# Patient Record
Sex: FEMALE | Race: WHITE | NOT HISPANIC OR LATINO | URBAN - NONMETROPOLITAN AREA
[De-identification: names, ages, dates, MRNs, and addresses within clinical notes are randomized per-mention and may not be internally consistent; named-entity substitution may affect disease eponyms.]

---

## 2017-10-16 ENCOUNTER — TELEPHONE (OUTPATIENT)
Dept: VASCULAR SURGERY | Facility: CLINIC | Age: 22
End: 2017-10-16

## 2017-10-17 ENCOUNTER — OFFICE VISIT (OUTPATIENT)
Dept: VASCULAR SURGERY | Facility: CLINIC | Age: 22
End: 2017-10-17

## 2017-10-17 VITALS
WEIGHT: 180 LBS | DIASTOLIC BLOOD PRESSURE: 66 MMHG | HEIGHT: 64 IN | SYSTOLIC BLOOD PRESSURE: 102 MMHG | BODY MASS INDEX: 30.73 KG/M2 | HEART RATE: 100 BPM

## 2017-10-17 DIAGNOSIS — M79.89 LEG SWELLING: Primary | ICD-10-CM

## 2017-10-17 DIAGNOSIS — Z87.828 HISTORY OF GUNSHOT WOUND: ICD-10-CM

## 2017-10-17 PROCEDURE — 99213 OFFICE O/P EST LOW 20 MIN: CPT | Performed by: SURGERY
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
## 2020-09-19 ENCOUNTER — HOSPITAL ENCOUNTER (EMERGENCY)
Facility: HOSPITAL | Age: 25
Discharge: HOME | End: 2020-09-19
Admitting: EMERGENCY MEDICINE

## 2020-09-19 ENCOUNTER — APPOINTMENT (OUTPATIENT)
Dept: GENERAL RADIOLOGY | Facility: HOSPITAL | Age: 25
End: 2020-09-19

## 2020-09-19 VITALS
DIASTOLIC BLOOD PRESSURE: 95 MMHG | HEART RATE: 71 BPM | SYSTOLIC BLOOD PRESSURE: 139 MMHG | TEMPERATURE: 98 F | BODY MASS INDEX: 45.71 KG/M2 | RESPIRATION RATE: 16 BRPM | WEIGHT: 258 LBS | OXYGEN SATURATION: 99 % | HEIGHT: 63 IN

## 2020-09-19 DIAGNOSIS — S80.12XA CONTUSION OF MULTIPLE SITES OF LEFT LOWER EXTREMITY, INITIAL ENCOUNTER: Primary | ICD-10-CM

## 2020-09-19 PROCEDURE — 73560 X-RAY EXAM OF KNEE 1 OR 2: CPT

## 2020-09-19 PROCEDURE — 73590 X-RAY EXAM OF LOWER LEG: CPT

## 2020-09-19 PROCEDURE — 99283 EMERGENCY DEPT VISIT LOW MDM: CPT

## 2020-09-19 RX ORDER — NAPROXEN 500 MG/1
500 TABLET ORAL 2 TIMES DAILY PRN
Qty: 10 TABLET | Refills: 0 | Status: SHIPPED | OUTPATIENT
Start: 2020-09-19 | End: 2020-09-24

## 2020-09-19 RX ORDER — TRAZODONE HYDROCHLORIDE 50 MG/1
50 TABLET ORAL NIGHTLY
COMMUNITY

## 2024-02-22 LAB
AMPHETAMINES UR QL: NEGATIVE
BACTERIA SPEC AEROBE CULT: NO GROWTH
BUPRENORPHINE SERPL-MCNC: NEGATIVE NG/ML
C TRACH RRNA SPEC DONR QL NAA+PROBE: NOT DETECTED
CANNABINOIDS SERPL QL: POSITIVE
COCAINE SERPL CFM-MCNC: NEGATIVE NG/ML
EXTERNAL ABO GROUPING: NORMAL
EXTERNAL AMPHETAMINE SCREEN URINE: NEGATIVE
EXTERNAL ANTIBODY SCREEN: NORMAL
EXTERNAL ANTIBODY SCREEN: NORMAL
EXTERNAL BARBITURATE SCREEN URINE: NEGATIVE
EXTERNAL BENZODIAZEPINE SCREEN URINE: NEGATIVE
EXTERNAL HEMATOCRIT: 38 %
EXTERNAL HEMOGLOBIN: 12.2 G/DL
EXTERNAL HEPATITIS B SURFACE ANTIGEN: NEGATIVE
EXTERNAL METHADONE SCREEN URINE: NEGATIVE
EXTERNAL PHENCYCLIDINE SCREEN URINE: NEGATIVE
EXTERNAL PLATELET COUNT: 354 K/ΜL
EXTERNAL PROPOXYPHENE SCREEN URINE: NEGATIVE
EXTERNAL RH FACTOR: POSITIVE
EXTERNAL SYPHILIS RPR SCREEN: NORMAL
HCV AB S/CO SERPL IA: NORMAL
HIV 1+2 AB+HIV1 P24 AG SERPL QL IA: NEGATIVE
N GONORRHOEA DNA SPEC QL NAA+PROBE: NOT DETECTED
OPIATES UR QL: NEGATIVE
OXYCODONE UR QL SCN: NEGATIVE
RUBV IGG SERPL IA-ACNC: NORMAL
TRICYCLICS UR QL SCN: NEGATIVE

## 2024-03-07 LAB
GLUCOSE 1H P 100 G GLC PO SERPL-MCNC: 141 MG/DL
GLUCOSE 1H P 100 G GLC PO SERPL-MCNC: 141 MG/DL (ref 74–180)

## 2024-03-25 ENCOUNTER — INITIAL PRENATAL (OUTPATIENT)
Age: 29
End: 2024-03-25
Payer: COMMERCIAL

## 2024-03-25 VITALS
BODY MASS INDEX: 40.46 KG/M2 | WEIGHT: 237 LBS | HEIGHT: 64 IN | SYSTOLIC BLOOD PRESSURE: 128 MMHG | DIASTOLIC BLOOD PRESSURE: 90 MMHG

## 2024-03-25 DIAGNOSIS — Z3A.29 29 WEEKS GESTATION OF PREGNANCY: Primary | ICD-10-CM

## 2024-03-25 DIAGNOSIS — Z71.85 IMMUNIZATION COUNSELING: ICD-10-CM

## 2024-03-25 DIAGNOSIS — O99.810 ABNORMAL GLUCOSE TOLERANCE TEST IN PREGNANCY, ANTEPARTUM: ICD-10-CM

## 2024-03-25 DIAGNOSIS — Z23 NEED FOR TDAP VACCINATION: ICD-10-CM

## 2024-03-25 DIAGNOSIS — O09.30 LATE PRENATAL CARE: ICD-10-CM

## 2024-03-25 DIAGNOSIS — O99.330 TOBACCO USE DURING PREGNANCY, ANTEPARTUM: ICD-10-CM

## 2024-03-25 DIAGNOSIS — Z34.03 PRIMIGRAVIDA, THIRD TRIMESTER: ICD-10-CM

## 2024-03-25 DIAGNOSIS — O99.320 MARIJUANA USE DURING PREGNANCY: ICD-10-CM

## 2024-03-25 DIAGNOSIS — F12.90 MARIJUANA USE DURING PREGNANCY: ICD-10-CM

## 2024-03-25 DIAGNOSIS — O13.3 GESTATIONAL HYPERTENSION, THIRD TRIMESTER: ICD-10-CM

## 2024-03-25 PROBLEM — Z34.90 PREGNANCY: Status: ACTIVE | Noted: 2024-03-25

## 2024-03-25 PROBLEM — O13.9 GESTATIONAL HYPERTENSION, ANTEPARTUM: Status: ACTIVE | Noted: 2024-03-25

## 2024-03-25 PROCEDURE — 90471 IMMUNIZATION ADMIN: CPT | Performed by: ADVANCED PRACTICE MIDWIFE

## 2024-03-25 PROCEDURE — 90715 TDAP VACCINE 7 YRS/> IM: CPT | Performed by: ADVANCED PRACTICE MIDWIFE

## 2024-03-25 PROCEDURE — 99203 OFFICE O/P NEW LOW 30 MIN: CPT | Performed by: ADVANCED PRACTICE MIDWIFE

## 2024-03-25 RX ORDER — PNV NO.118/IRON FUMARATE/FA 29 MG-1 MG
TABLET,CHEWABLE ORAL
COMMUNITY
Start: 2024-03-22

## 2024-03-25 RX ORDER — PROMETHAZINE HYDROCHLORIDE 25 MG/1
TABLET ORAL
COMMUNITY
Start: 2024-02-22
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
## 2024-03-26 LAB
ALBUMIN SERPL-MCNC: 3.7 G/DL (ref 4–5)
ALBUMIN/GLOB SERPL: 1.4 {RATIO} (ref 1.2–2.2)
ALP SERPL-CCNC: 105 IU/L (ref 44–121)
ALT SERPL-CCNC: 51 IU/L (ref 0–32)
AST SERPL-CCNC: 32 IU/L (ref 0–40)
BASOPHILS # BLD AUTO: 0.1 X10E3/UL (ref 0–0.2)
BASOPHILS NFR BLD AUTO: 1 %
BILIRUB SERPL-MCNC: <0.2 MG/DL (ref 0–1.2)
BUN SERPL-MCNC: 6 MG/DL (ref 6–20)
BUN/CREAT SERPL: 13 (ref 9–23)
CALCIUM SERPL-MCNC: 9.4 MG/DL (ref 8.7–10.2)
CHLORIDE SERPL-SCNC: 104 MMOL/L (ref 96–106)
CO2 SERPL-SCNC: 19 MMOL/L (ref 20–29)
CREAT SERPL-MCNC: 0.48 MG/DL (ref 0.57–1)
EGFRCR SERPLBLD CKD-EPI 2021: 131 ML/MIN/1.73
EOSINOPHIL # BLD AUTO: 0.1 X10E3/UL (ref 0–0.4)
EOSINOPHIL NFR BLD AUTO: 0 %
ERYTHROCYTE [DISTWIDTH] IN BLOOD BY AUTOMATED COUNT: 13.1 % (ref 11.7–15.4)
GLOBULIN SER CALC-MCNC: 2.6 G/DL (ref 1.5–4.5)
GLUCOSE SERPL-MCNC: 102 MG/DL (ref 70–99)
HCT VFR BLD AUTO: 40.4 % (ref 34–46.6)
HGB BLD-MCNC: 13.6 G/DL (ref 11.1–15.9)
IMM GRANULOCYTES # BLD AUTO: 0.4 X10E3/UL (ref 0–0.1)
IMM GRANULOCYTES NFR BLD AUTO: 3 %
LDH SERPL L TO P-CCNC: 174 IU/L (ref 119–226)
LYMPHOCYTES # BLD AUTO: 1.7 X10E3/UL (ref 0.7–3.1)
LYMPHOCYTES NFR BLD AUTO: 13 %
MCH RBC QN AUTO: 30.1 PG (ref 26.6–33)
MCHC RBC AUTO-ENTMCNC: 33.7 G/DL (ref 31.5–35.7)
MCV RBC AUTO: 89 FL (ref 79–97)
MONOCYTES # BLD AUTO: 1 X10E3/UL (ref 0.1–0.9)
MONOCYTES NFR BLD AUTO: 8 %
NEUTROPHILS # BLD AUTO: 9.8 X10E3/UL (ref 1.4–7)
NEUTROPHILS NFR BLD AUTO: 75 %
PLATELET # BLD AUTO: 332 X10E3/UL (ref 150–450)
POTASSIUM SERPL-SCNC: 4.3 MMOL/L (ref 3.5–5.2)
PROT 24H UR-MRATE: 180 MG/24 HR (ref 30–150)
PROT SERPL-MCNC: 6.3 G/DL (ref 6–8.5)
PROT UR-MCNC: 18.9 MG/DL
RBC # BLD AUTO: 4.52 X10E6/UL (ref 3.77–5.28)
SODIUM SERPL-SCNC: 138 MMOL/L (ref 134–144)
URATE SERPL-MCNC: 4.6 MG/DL (ref 2.6–6.2)
WBC # BLD AUTO: 13 X10E3/UL (ref 3.4–10.8)

## 2024-03-27 ENCOUNTER — REFERRAL TRIAGE (OUTPATIENT)
Dept: LABOR AND DELIVERY | Facility: HOSPITAL | Age: 29
End: 2024-03-27
Payer: COMMERCIAL

## 2024-03-27 ENCOUNTER — PATIENT OUTREACH (OUTPATIENT)
Dept: LABOR AND DELIVERY | Facility: HOSPITAL | Age: 29
End: 2024-03-27
Payer: COMMERCIAL

## 2024-03-27 NOTE — OUTREACH NOTE
[facility]  Enrollment    Current Estimated Gestational Age: 30w0d    Questions/Answers      Flowsheet Row Responses   Would like to participate? Yes   Date of Intake Visit 03/27/24            [facility]  Intake    Current Estimated Gestational Age: 30w0d    Intake Assessment      Flowsheet Row Responses   Best Method for Contacting Cell   Currently Employed No   Able to keep appointments as scheduled Yes   Gender(s) and Name(s) boy   Do you have a dentist? Yes   Resources Presently Utilizing: WIC (Women, Infant, Children)   Maternal Warning Signs Provided   Other: Provided   Other Education HANDS, How to find a dentist, How to find a pediatrician, How to find a primary care provider, Insurance benefits/Incentives, Meds to Beds, Mental Health Services, Smoking/Vaping Cessation, SNAP Benefits, Substance Use Disorder Treatment, Transportation Assistance, WIC Benefits            Learning Assessment      Flowsheet Row Responses   Relationship Patient   Does the learner have any barriers to learning? No Barriers   What is the preferred language of the learner for medical teaching? English   How does the learner prefer to learn new concepts? Listening, Reading, Demonstration, Pictures/Video            SDOH updated and reviewed with the patient during this program:  --     Alcohol Use: Not At Risk (3/27/2024)    AUDIT-C     Frequency of Alcohol Consumption: Never     Average Number of Drinks: Patient does not drink     Frequency of Binge Drinking: Never      --     Depression: Not at risk (3/27/2024)    PHQ-2     PHQ-2 Score: 0      --     Disabilities: Not At Risk (3/27/2024)    Disabilities     Concentrating, Remembering, or Making Decisions Difficulty: no     Doing Errands Independently Difficulty: no      --     Employment: Not At Risk (3/27/2024)    Employment     Do you want help finding or keeping work or a job?: I do not need or want help      Financial Resource Strain: Low Risk  (3/27/2024)     Overall Financial Resource Strain (CARDIA)     Difficulty of Paying Living Expenses: Not hard at all      --     Food Insecurity: No Food Insecurity (3/27/2024)    Hunger Vital Sign     Worried About Running Out of Food in the Last Year: Never true     Ran Out of Food in the Last Year: Never true      --     Health Literacy: Not At Risk (3/27/2024)    Education     Help with school or training?: No     Preferred Language: English      --     Housing Stability: Not At Risk (3/27/2024)    Housing Stability     Current Living Arrangements: home     Potentially Unsafe Housing Conditions: none      --     Interpersonal Safety: Not At Risk (3/27/2024)    Abuse Screen     Unsafe at Home or Work/School: no     Feels Threatened by Someone?: no     Does Anyone Keep You from Contacting Others or Doint Things Outside the Home?: no     Physical Sign of Abuse Present: no      --     Physical Activity: Insufficiently Active (3/27/2024)    Exercise Vital Sign     Days of Exercise per Week: 2 days     Minutes of Exercise per Session: 20 min      --     Social Connections: Not At Risk (3/27/2024)    Family and Community Support     Help with Day-to-Day Activities: I don't need any help     Lonely or Isolated: Rarely      --     Transportation Needs: No Transportation Needs (3/27/2024)    PRAPARE - Transportation     Lack of Transportation (Medical): No     Lack of Transportation (Non-Medical): No      --     Utilities: Not At Risk (3/27/2024)    [facility] Utilities     Threatened with loss of utilities: No       Referral submitted to the following resources (verbal consent received to submit demographic information):     HANDS      Anjali Ramirez RN  Maternity Nurse Navigator    3/27/2024, 14:02 CDT            Anjali Ramirez RN  Maternity Nurse Navigator    3/27/2024, 14:02 CDT

## 2024-04-11 ENCOUNTER — ROUTINE PRENATAL (OUTPATIENT)
Age: 29
End: 2024-04-11
Payer: COMMERCIAL

## 2024-04-11 VITALS — BODY MASS INDEX: 41.02 KG/M2 | WEIGHT: 239 LBS | SYSTOLIC BLOOD PRESSURE: 128 MMHG | DIASTOLIC BLOOD PRESSURE: 74 MMHG

## 2024-04-11 DIAGNOSIS — Z3A.32 32 WEEKS GESTATION OF PREGNANCY: Primary | ICD-10-CM

## 2024-04-11 DIAGNOSIS — O99.810 ABNORMAL GLUCOSE IN PREGNANCY, ANTEPARTUM: ICD-10-CM

## 2024-04-11 DIAGNOSIS — Z34.03 PRIMIGRAVIDA, THIRD TRIMESTER: ICD-10-CM

## 2024-04-11 DIAGNOSIS — O13.3 GESTATIONAL HYPERTENSION, THIRD TRIMESTER: ICD-10-CM

## 2024-04-11 LAB
GLUCOSE UR STRIP-MCNC: NEGATIVE MG/DL
PROT UR STRIP-MCNC: NEGATIVE MG/DL

## 2024-04-11 PROCEDURE — 99213 OFFICE O/P EST LOW 20 MIN: CPT | Performed by: ADVANCED PRACTICE MIDWIFE

## 2024-04-11 NOTE — PROGRESS NOTES
Reason for visit: Routine OB visit at 32w1d     CC:  Endorses good fetal movement. Denies VB, LOF, contractions, h/a, visual changes, and right upper quadrant pain.     ROS: All systems reviewed and are negative with exception of the following: amenorrhea    Wt 108 kg (239 lb) lb for a TWG of 4.082 kg (9 lb), /74, FHTs 145, FH 31 cm  Urine today and reviewed: negative glucose, negative protein    27-week U/S: EFW 53.6%, 1039 g; AC 75.8%; SONIDO 15.35 cm, DVP 4.43 cm; fetal spine not visualized; anterior placenta     Exam:  General Appearance:  No visualized signs of distress. Normal mood and behavior  Cardiorespiratory: HR str and reg. Lungs clear. Resp even and unlabored.  Abdomen: is soft and nontender. no CVA tenderness. Uterus is consistent with estimated gestational age.  Ext: No edema. Calves non-tender.     Impression  Diagnoses and all orders for this visit:    1. 32 weeks gestation of pregnancy (Primary)  Discussed and encouraged practicing measures of relaxation for use during the birthing experience. She has an upcoming visit with [facility].  -     POC Urinalysis Dipstick  -     Gestational Screen 3 Hr ([facility])    2. Primigravida, third trimester  Discussed third trimester of pregnancy, discomforts and measures of support, fetal movement counts,  labor warnings, preeclampsia warnings, and signs and symptoms to report. Discussed and encouraged to come to the hospital or call with vaginal bleeding, leaking of fluid, contractions, or other concerns. Third Trimester of Pregnancy, Signs and Symptoms of Labor, and Alternative Pain Management During Labor and Delivery videos included in the AVS.    3. Gestational hypertension, third trimester  Reviewed signs to report.     4. Abnormal glucose in pregnancy, antepartum  Discussed with patient the results of her 1-hour with her previous provider and the need for a 3-hour glucose tolerance test.   -     Gestational Screen 3 Hr ([facility])          Return to the  office in 2 weeks for routine prenatal visit with Dr. Hassan and as needed with concerns.        This note has been signed electronically.    Meryl Frederick DNP, APRN, CNMELIDA, R

## 2024-04-16 ENCOUNTER — LAB (OUTPATIENT)
Dept: LAB | Facility: HOSPITAL | Age: 29
End: 2024-04-16
Payer: COMMERCIAL

## 2024-04-16 ENCOUNTER — TELEPHONE (OUTPATIENT)
Dept: OBSTETRICS AND GYNECOLOGY | Age: 29
End: 2024-04-16
Payer: COMMERCIAL

## 2024-04-16 ENCOUNTER — TRANSCRIBE ORDERS (OUTPATIENT)
Dept: ADMINISTRATIVE | Facility: HOSPITAL | Age: 29
End: 2024-04-16
Payer: COMMERCIAL

## 2024-04-16 DIAGNOSIS — O09.30 LATE PRENATAL CARE: ICD-10-CM

## 2024-04-16 DIAGNOSIS — O13.9 TRANSIENT HYPERTENSION OF PREGNANCY, WITH DELIVERY: ICD-10-CM

## 2024-04-16 DIAGNOSIS — O13.9 TRANSIENT HYPERTENSION OF PREGNANCY, WITH DELIVERY: Primary | ICD-10-CM

## 2024-04-16 LAB
ALBUMIN SERPL-MCNC: 3.4 G/DL (ref 3.5–5.2)
ALBUMIN/GLOB SERPL: 1 G/DL
ALP SERPL-CCNC: 150 U/L (ref 39–117)
ALT SERPL W P-5'-P-CCNC: 49 U/L (ref 1–33)
ANION GAP SERPL CALCULATED.3IONS-SCNC: 13 MMOL/L (ref 5–15)
AST SERPL-CCNC: 35 U/L (ref 1–32)
BILIRUB SERPL-MCNC: 0.3 MG/DL (ref 0–1.2)
BUN SERPL-MCNC: 10 MG/DL (ref 6–20)
BUN/CREAT SERPL: 21.7 (ref 7–25)
CALCIUM SPEC-SCNC: 9.6 MG/DL (ref 8.6–10.5)
CHLORIDE SERPL-SCNC: 101 MMOL/L (ref 98–107)
CO2 SERPL-SCNC: 20 MMOL/L (ref 22–29)
CREAT SERPL-MCNC: 0.46 MG/DL (ref 0.57–1)
CREAT UR-MCNC: 229.5 MG/DL
DEPRECATED RDW RBC AUTO: 43.9 FL (ref 37–54)
EGFRCR SERPLBLD CKD-EPI 2021: 133 ML/MIN/1.73
ERYTHROCYTE [DISTWIDTH] IN BLOOD BY AUTOMATED COUNT: 13.3 % (ref 12.3–15.4)
GLOBULIN UR ELPH-MCNC: 3.3 GM/DL
GLUCOSE SERPL-MCNC: 122 MG/DL (ref 65–99)
HCT VFR BLD AUTO: 42.8 % (ref 34–46.6)
HGB BLD-MCNC: 13.9 G/DL (ref 12–15.9)
MCH RBC QN AUTO: 29.5 PG (ref 26.6–33)
MCHC RBC AUTO-ENTMCNC: 32.5 G/DL (ref 31.5–35.7)
MCV RBC AUTO: 90.9 FL (ref 79–97)
PLATELET # BLD AUTO: 302 10*3/MM3 (ref 140–450)
PMV BLD AUTO: 9.3 FL (ref 6–12)
POTASSIUM SERPL-SCNC: 3.8 MMOL/L (ref 3.5–5.2)
PROT ?TM UR-MCNC: 43.6 MG/DL
PROT SERPL-MCNC: 6.7 G/DL (ref 6–8.5)
RBC # BLD AUTO: 4.71 10*6/MM3 (ref 3.77–5.28)
SODIUM SERPL-SCNC: 134 MMOL/L (ref 136–145)
WBC NRBC COR # BLD AUTO: 13.88 10*3/MM3 (ref 3.4–10.8)

## 2024-04-16 PROCEDURE — 84156 ASSAY OF PROTEIN URINE: CPT

## 2024-04-16 PROCEDURE — 80053 COMPREHEN METABOLIC PANEL: CPT

## 2024-04-16 PROCEDURE — 36415 COLL VENOUS BLD VENIPUNCTURE: CPT

## 2024-04-16 PROCEDURE — 82570 ASSAY OF URINE CREATININE: CPT

## 2024-04-16 PROCEDURE — 85027 COMPLETE CBC AUTOMATED: CPT

## 2024-04-16 NOTE — TELEPHONE ENCOUNTER
Erika with [facility] called stating pt was seen in their office today. Her BP was elevated so RUBEN Rosales started her on Procardia. Pt told them she was coming here on Thursday for her glucose screening and they are recommending a BPP or NST that day. She is not scheduled because she is coming in for 3 hr gtt. I did not see any openings available. Can you help with scheduling pt?

## 2024-04-16 NOTE — TELEPHONE ENCOUNTER
I am headed to a meeting, please take this to the  and ask them to schedule this patient with an US and MD for Thursday while she is her for her glucose test.  Thanks

## 2024-04-18 ENCOUNTER — ROUTINE PRENATAL (OUTPATIENT)
Age: 29
End: 2024-04-18
Payer: COMMERCIAL

## 2024-04-18 ENCOUNTER — TELEPHONE (OUTPATIENT)
Age: 29
End: 2024-04-18
Payer: COMMERCIAL

## 2024-04-18 VITALS — WEIGHT: 243 LBS | BODY MASS INDEX: 41.71 KG/M2 | DIASTOLIC BLOOD PRESSURE: 94 MMHG | SYSTOLIC BLOOD PRESSURE: 130 MMHG

## 2024-04-18 DIAGNOSIS — O13.3 GESTATIONAL HYPERTENSION, THIRD TRIMESTER: ICD-10-CM

## 2024-04-18 DIAGNOSIS — Z34.03 PRIMIGRAVIDA, THIRD TRIMESTER: ICD-10-CM

## 2024-04-18 DIAGNOSIS — Z3A.33 33 WEEKS GESTATION OF PREGNANCY: Primary | ICD-10-CM

## 2024-04-18 LAB
GLUCOSE UR STRIP-MCNC: NEGATIVE MG/DL
PROT UR STRIP-MCNC: ABNORMAL MG/DL

## 2024-04-18 RX ORDER — NIFEDIPINE 30 MG/1
30 TABLET, EXTENDED RELEASE ORAL DAILY
COMMUNITY
Start: 2024-04-16

## 2024-04-18 NOTE — TELEPHONE ENCOUNTER
Called and reminded pt of need for 3hr gtt and to come in fasting and she is coming in Monday morning at 0800 as she has a [facility] appt on Monday.

## 2024-04-18 NOTE — PROGRESS NOTES
Started on procardia due to elevated BP. BP stable today. UP trace. No PIH symptoms. BPP 8/8. US at [facility], baby 2 weeks ahead in growth.     /94   Wt 110 kg (243 lb)   LMP  (LMP Unknown)   BMI 41.71 kg/m²    FHTs 156  BPP 8/8    Diagnoses and all orders for this visit:    1. 33 weeks gestation of pregnancy (Primary)  -     POC Urinalysis Dipstick  IUP at 33 weeks gestation, doing well. Baby 2 weeks ahead in growth. Elevated 1 hour GTT. Has not had 3 hour GTT from what I can see. Order in computer for 3 hour GTT.   2. Primigravida, third trimester    3. Gestational hypertension, third trimester  BP stable. BPP 8/8. Appt at [facility] Monday. RTC with us in 1 week.

## 2024-04-22 ENCOUNTER — TRANSCRIBE ORDERS (OUTPATIENT)
Dept: ADMINISTRATIVE | Facility: HOSPITAL | Age: 29
End: 2024-04-22
Payer: COMMERCIAL

## 2024-04-22 ENCOUNTER — LAB (OUTPATIENT)
Dept: LAB | Facility: HOSPITAL | Age: 29
End: 2024-04-22
Payer: COMMERCIAL

## 2024-04-22 DIAGNOSIS — O13.9 TRANSIENT HYPERTENSION OF PREGNANCY, WITH DELIVERY: Primary | ICD-10-CM

## 2024-04-22 DIAGNOSIS — O13.9 TRANSIENT HYPERTENSION OF PREGNANCY, WITH DELIVERY: ICD-10-CM

## 2024-04-22 LAB
ALBUMIN SERPL-MCNC: 3.3 G/DL (ref 3.5–5.2)
ALBUMIN/GLOB SERPL: 1 G/DL
ALP SERPL-CCNC: 150 U/L (ref 39–117)
ALT SERPL W P-5'-P-CCNC: 47 U/L (ref 1–33)
ANION GAP SERPL CALCULATED.3IONS-SCNC: 11 MMOL/L (ref 5–15)
AST SERPL-CCNC: 35 U/L (ref 1–32)
BASOPHILS # BLD AUTO: 0.06 10*3/MM3 (ref 0–0.2)
BASOPHILS NFR BLD AUTO: 0.5 % (ref 0–1.5)
BILIRUB SERPL-MCNC: 0.2 MG/DL (ref 0–1.2)
BUN SERPL-MCNC: 8 MG/DL (ref 6–20)
BUN/CREAT SERPL: 13.3 (ref 7–25)
CALCIUM SPEC-SCNC: 9.1 MG/DL (ref 8.6–10.5)
CHLORIDE SERPL-SCNC: 104 MMOL/L (ref 98–107)
CO2 SERPL-SCNC: 21 MMOL/L (ref 22–29)
CREAT SERPL-MCNC: 0.6 MG/DL (ref 0.57–1)
DEPRECATED RDW RBC AUTO: 42.3 FL (ref 37–54)
EGFRCR SERPLBLD CKD-EPI 2021: 124.8 ML/MIN/1.73
EOSINOPHIL # BLD AUTO: 0.02 10*3/MM3 (ref 0–0.4)
EOSINOPHIL NFR BLD AUTO: 0.2 % (ref 0.3–6.2)
ERYTHROCYTE [DISTWIDTH] IN BLOOD BY AUTOMATED COUNT: 13.2 % (ref 12.3–15.4)
GLOBULIN UR ELPH-MCNC: 3.4 GM/DL
GLUCOSE SERPL-MCNC: 109 MG/DL (ref 65–99)
HCT VFR BLD AUTO: 41.8 % (ref 34–46.6)
HGB BLD-MCNC: 13.8 G/DL (ref 12–15.9)
IMM GRANULOCYTES # BLD AUTO: 0.23 10*3/MM3 (ref 0–0.05)
IMM GRANULOCYTES NFR BLD AUTO: 2 % (ref 0–0.5)
LYMPHOCYTES # BLD AUTO: 2.02 10*3/MM3 (ref 0.7–3.1)
LYMPHOCYTES NFR BLD AUTO: 17.5 % (ref 19.6–45.3)
MCH RBC QN AUTO: 29.4 PG (ref 26.6–33)
MCHC RBC AUTO-ENTMCNC: 33 G/DL (ref 31.5–35.7)
MCV RBC AUTO: 89.1 FL (ref 79–97)
MONOCYTES # BLD AUTO: 1.08 10*3/MM3 (ref 0.1–0.9)
MONOCYTES NFR BLD AUTO: 9.4 % (ref 5–12)
NEUTROPHILS NFR BLD AUTO: 70.4 % (ref 42.7–76)
NEUTROPHILS NFR BLD AUTO: 8.12 10*3/MM3 (ref 1.7–7)
NRBC BLD AUTO-RTO: 0 /100 WBC (ref 0–0.2)
PLATELET # BLD AUTO: 316 10*3/MM3 (ref 140–450)
PMV BLD AUTO: 8.9 FL (ref 6–12)
POTASSIUM SERPL-SCNC: 4 MMOL/L (ref 3.5–5.2)
PROT SERPL-MCNC: 6.7 G/DL (ref 6–8.5)
RBC # BLD AUTO: 4.69 10*6/MM3 (ref 3.77–5.28)
SODIUM SERPL-SCNC: 136 MMOL/L (ref 136–145)
WBC NRBC COR # BLD AUTO: 11.53 10*3/MM3 (ref 3.4–10.8)

## 2024-04-22 PROCEDURE — 84156 ASSAY OF PROTEIN URINE: CPT

## 2024-04-22 PROCEDURE — 85025 COMPLETE CBC W/AUTO DIFF WBC: CPT

## 2024-04-22 PROCEDURE — 82570 ASSAY OF URINE CREATININE: CPT

## 2024-04-22 PROCEDURE — 36415 COLL VENOUS BLD VENIPUNCTURE: CPT

## 2024-04-22 PROCEDURE — 80053 COMPREHEN METABOLIC PANEL: CPT

## 2024-04-23 LAB
CREAT UR-MCNC: 223.5 MG/DL
PROT ?TM UR-MCNC: 32.7 MG/DL

## 2024-04-26 ENCOUNTER — ROUTINE PRENATAL (OUTPATIENT)
Age: 29
End: 2024-04-26
Payer: COMMERCIAL

## 2024-04-26 VITALS — DIASTOLIC BLOOD PRESSURE: 94 MMHG | WEIGHT: 238.6 LBS | SYSTOLIC BLOOD PRESSURE: 140 MMHG | BODY MASS INDEX: 40.96 KG/M2

## 2024-04-26 DIAGNOSIS — Z3A.34 34 WEEKS GESTATION OF PREGNANCY: Primary | ICD-10-CM

## 2024-04-26 DIAGNOSIS — O13.3 GESTATIONAL HYPERTENSION, THIRD TRIMESTER: ICD-10-CM

## 2024-04-26 DIAGNOSIS — O24.410 DIET CONTROLLED GESTATIONAL DIABETES MELLITUS (GDM) IN THIRD TRIMESTER: ICD-10-CM

## 2024-04-26 LAB
GLUCOSE 1H P 100 G GLC PO SERPL-MCNC: 172 MG/DL (ref 70–179)
GLUCOSE 2H P 100 G GLC PO SERPL-MCNC: 156 MG/DL (ref 70–154)
GLUCOSE 3H P 100 G GLC PO SERPL-MCNC: 144 MG/DL (ref 70–139)
GLUCOSE P FAST SERPL-MCNC: 87 MG/DL (ref 70–94)
GLUCOSE UR STRIP-MCNC: NEGATIVE MG/DL
Lab: ABNORMAL
PROT UR STRIP-MCNC: ABNORMAL MG/DL

## 2024-04-26 RX ORDER — BLOOD-GLUCOSE METER
KIT MISCELLANEOUS
Qty: 1 EACH | Refills: 0 | Status: SHIPPED | OUTPATIENT
Start: 2024-04-26

## 2024-04-26 RX ORDER — LANCETS 30 GAUGE
EACH MISCELLANEOUS
Qty: 120 EACH | Refills: 3 | Status: SHIPPED | OUTPATIENT
Start: 2024-04-26

## 2024-04-26 NOTE — PROGRESS NOTES
No complaints. BPP 8/8 today. BP stable. Procardia increased to 60 mg.  Denies PIH symptoms.  Had appt with peds urology.  Gfm. Denies VB, LOF, ctx    /94   Wt 108 kg (238 lb 9.6 oz)   LMP  (LMP Unknown)   BMI 40.96 kg/m²    FHTs 128  Urine protein trace, urine glucose negative  BPP 8/8    Diagnoses and all orders for this visit:    1. 34 weeks gestation of pregnancy (Primary)  -     POC Urinalysis Dipstick    2. Gestational hypertension, third trimester  2x weekly BPP and follow up.  Now on procardia xl 60 mg daily.   3. Diet controlled gestational diabetes mellitus (GDM) in third trimester  Elevated 1hour GTT. 3 hour GTT with 2 abnormal values but normal fasting.  Pt to check FBS and 2 hour pp, pb in light of LGA baby.  RTC 1 week with BPP.    Other orders  -     glucose monitor monitoring kit; Check BS fasting and 2 hours postprandial  Dispense: 1 each; Refill: 0  -     Lancets misc; Use as directed to check blood sugar four times a day, fasting and 2 hours postprandial  Dispense: 120 each; Refill: 3  -     glucose blood test strip; Use as instructed, fasting and 2 hour pp  Dispense: 120 each; Refill: 12

## 2024-04-28 DIAGNOSIS — O24.419 GESTATIONAL DIABETES MELLITUS (GDM) AFFECTING PREGNANCY: Primary | ICD-10-CM

## 2024-05-03 ENCOUNTER — ROUTINE PRENATAL (OUTPATIENT)
Age: 29
End: 2024-05-03
Payer: COMMERCIAL

## 2024-05-03 VITALS — DIASTOLIC BLOOD PRESSURE: 88 MMHG | WEIGHT: 240 LBS | SYSTOLIC BLOOD PRESSURE: 136 MMHG | BODY MASS INDEX: 41.2 KG/M2

## 2024-05-03 DIAGNOSIS — Z34.03 PRIMIGRAVIDA, THIRD TRIMESTER: ICD-10-CM

## 2024-05-03 DIAGNOSIS — Z3A.35 35 WEEKS GESTATION OF PREGNANCY: Primary | ICD-10-CM

## 2024-05-03 DIAGNOSIS — O13.3 GESTATIONAL HYPERTENSION, THIRD TRIMESTER: ICD-10-CM

## 2024-05-03 DIAGNOSIS — O24.419 GESTATIONAL DIABETES MELLITUS (GDM) AFFECTING PREGNANCY: ICD-10-CM

## 2024-05-03 LAB
GLUCOSE UR STRIP-MCNC: NEGATIVE MG/DL
PROT UR STRIP-MCNC: ABNORMAL MG/DL

## 2024-05-03 RX ORDER — NIFEDIPINE 60 MG/1
60 TABLET, EXTENDED RELEASE ORAL DAILY
Status: ON HOLD | COMMUNITY
Start: 2024-04-29

## 2024-05-07 ENCOUNTER — PATIENT OUTREACH (OUTPATIENT)
Dept: LABOR AND DELIVERY | Facility: HOSPITAL | Age: 29
End: 2024-05-07
Payer: COMMERCIAL

## 2024-05-10 ENCOUNTER — PATIENT OUTREACH (OUTPATIENT)
Dept: LABOR AND DELIVERY | Facility: HOSPITAL | Age: 29
End: 2024-05-10
Payer: COMMERCIAL

## 2024-05-10 ENCOUNTER — ROUTINE PRENATAL (OUTPATIENT)
Age: 29
End: 2024-05-10
Payer: COMMERCIAL

## 2024-05-10 VITALS — SYSTOLIC BLOOD PRESSURE: 142 MMHG | WEIGHT: 245 LBS | DIASTOLIC BLOOD PRESSURE: 94 MMHG | BODY MASS INDEX: 42.05 KG/M2

## 2024-05-10 DIAGNOSIS — Z3A.36 36 WEEKS GESTATION OF PREGNANCY: ICD-10-CM

## 2024-05-10 DIAGNOSIS — O13.9 GESTATIONAL HYPERTENSION, ANTEPARTUM: ICD-10-CM

## 2024-05-10 DIAGNOSIS — O35.EXX0 PYELECTASIS OF FETUS ON PRENATAL ULTRASOUND: ICD-10-CM

## 2024-05-10 DIAGNOSIS — Z34.83 MULTIGRAVIDA IN THIRD TRIMESTER: Primary | ICD-10-CM

## 2024-05-10 DIAGNOSIS — O24.419 GESTATIONAL DIABETES MELLITUS (GDM) AFFECTING PREGNANCY: ICD-10-CM

## 2024-05-10 LAB
GLUCOSE UR STRIP-MCNC: NEGATIVE MG/DL
PROT UR STRIP-MCNC: ABNORMAL MG/DL

## 2024-05-10 NOTE — PROGRESS NOTES
Reason for visit: Routine OB visit at 36w2d     CC:  She had a headache that started this morning, but she denies it at this time. Reports it improved after eating and resolved with without medication. Endorses good fetal movement. Denies VB, LOF, contractions other than BH, h/a at present, visual changes, and right upper quadrant pain.     ROS: All systems reviewed and are negative with exception of the following: amenorrhea, BH contractions    Wt 111 kg (245 lb) lb for a TWG of 6.804 kg (15 lb), /108 with repeat 142/94, FHTs 144, FH 36 cm  Urine today and reviewed: negative glucose, trace protein      36-week ultrasound today: BPP 8/8-reassuring; SONIDO 15.8 cm, DVP 6.3 cm; vertex presentation; anterior placenta     32-week U/S: EFW 92%, 2506 g; AC > 97%; SONIDO 21.3 cm, DVP 7.4 cm; UA S/D 2.2 (25%); larger for gestational age; left sided pyelectasis (13 mm); anterior placenta without signs of previa       Exam:  General Appearance:  No visualized signs of distress. Normal mood and behavior  Cardiorespiratory: HR str and reg. Lungs clear. Resp even and unlabored.  Abdomen: is soft and nontender.  No CVA tenderness. Uterus is consistent with estimated gestational age.  Ext: No edema. Calves non-tender.     Impression  Diagnoses and all orders for this visit:    1. Multigravida in third trimester (Primary)  Discussed third trimester discomforts and measures of support, fetal movement counts, signs of labor, preeclampsia warnings, and signs and symptoms to report. Reviewed GBS and STI cultures to be completed today. Discussed and encouraged to come to the hospital or call with vaginal bleeding, leaking of fluid, regular contractions, or other concerns. Signs and Symptoms of Labor and Alternative Pain Management During Labor and Delivery videos included in the AVS.    2. 36 weeks gestation of pregnancy  Discussed and encouraged practicing measures of relaxation during the birthing experience. Also discussed measures  to support or promote labor at term.   -     POC Urinalysis Dipstick  -     Cancel: Chlamydia trachomatis, Neisseria gonorrhoeae, Trichomonas vaginalis, PCR - Swab, Vagina  -     Cancel: Strep B Screen - Swab, Vaginal/Rectum  -     CBC & Differential  -     Comprehensive Metabolic Panel  -     Uric Acid  -     Lactate Dehydrogenase  -     Protein / Creatinine Ratio, Urine - Urine, Clean Catch  -     Chlamydia trachomatis, Neisseria gonorrhoeae, Trichomonas vaginalis, PCR - Swab, Vagina  -     Strep B Screen - Swab, Vaginal/Rectum    3. Gestational hypertension, antepartum  Reviewed blood pressure and assessment today with Dr. Hassan for collaboration with plan of care. She is managed on Procardia XL. Will continue with present dose and draw hypertension related labs. Discussed with patient signs to report.   -     CBC & Differential  -     Comprehensive Metabolic Panel  -     Uric Acid  -     Lactate Dehydrogenase  -     Protein / Creatinine Ratio, Urine - Urine, Clean Catch    4. Gestational diabetes mellitus (GDM) affecting pregnancy  BPP today 8/8-reassuring.  Noted to have infant large for gestational age at 32 weeks gestation.  She reports her blood sugars have usually been within control. The fasting blood sugars have been less than 95 with a fasting once of 108. She reports her blood sugars run closer to the 120 at 2-hours or 140 at 1-hour. The highest has been 160.    5. Pyelectasis of fetus on prenatal ultrasound  Has follow-up ultrasound for interval growth and assessment of kidneys with MFM.          Return to the office in 1 week for routine prenatal visit with Dr. Hassan for BPP and as needed with concerns.        This note has been signed electronically.    Meryl Frederick, DNP, APRN, CNM, RNC-OB

## 2024-05-11 LAB
ALBUMIN SERPL-MCNC: 3.2 G/DL (ref 4–5)
ALBUMIN/GLOB SERPL: 1.2 {RATIO} (ref 1.2–2.2)
ALP SERPL-CCNC: 195 IU/L (ref 44–121)
ALT SERPL-CCNC: 63 IU/L (ref 0–32)
AST SERPL-CCNC: 33 IU/L (ref 0–40)
BASOPHILS # BLD AUTO: 0 X10E3/UL (ref 0–0.2)
BASOPHILS NFR BLD AUTO: 0 %
BILIRUB SERPL-MCNC: <0.2 MG/DL (ref 0–1.2)
BUN SERPL-MCNC: 9 MG/DL (ref 6–20)
BUN/CREAT SERPL: 16 (ref 9–23)
CALCIUM SERPL-MCNC: 8.9 MG/DL (ref 8.7–10.2)
CHLORIDE SERPL-SCNC: 103 MMOL/L (ref 96–106)
CO2 SERPL-SCNC: 18 MMOL/L (ref 20–29)
CREAT SERPL-MCNC: 0.57 MG/DL (ref 0.57–1)
CREAT UR-MCNC: 191.2 MG/DL
EGFRCR SERPLBLD CKD-EPI 2021: 126 ML/MIN/1.73
EOSINOPHIL # BLD AUTO: 0 X10E3/UL (ref 0–0.4)
EOSINOPHIL NFR BLD AUTO: 0 %
ERYTHROCYTE [DISTWIDTH] IN BLOOD BY AUTOMATED COUNT: 13.2 % (ref 11.7–15.4)
GLOBULIN SER CALC-MCNC: 2.6 G/DL (ref 1.5–4.5)
GLUCOSE SERPL-MCNC: 75 MG/DL (ref 70–99)
HCT VFR BLD AUTO: 40.3 % (ref 34–46.6)
HGB BLD-MCNC: 14.1 G/DL (ref 11.1–15.9)
IMM GRANULOCYTES # BLD AUTO: 0.1 X10E3/UL (ref 0–0.1)
IMM GRANULOCYTES NFR BLD AUTO: 1 %
LDH SERPL L TO P-CCNC: 191 IU/L (ref 119–226)
LYMPHOCYTES # BLD AUTO: 2 X10E3/UL (ref 0.7–3.1)
LYMPHOCYTES NFR BLD AUTO: 19 %
MCH RBC QN AUTO: 30.9 PG (ref 26.6–33)
MCHC RBC AUTO-ENTMCNC: 35 G/DL (ref 31.5–35.7)
MCV RBC AUTO: 88 FL (ref 79–97)
MONOCYTES # BLD AUTO: 0.9 X10E3/UL (ref 0.1–0.9)
MONOCYTES NFR BLD AUTO: 9 %
NEUTROPHILS # BLD AUTO: 7.2 X10E3/UL (ref 1.4–7)
NEUTROPHILS NFR BLD AUTO: 71 %
PLATELET # BLD AUTO: 283 X10E3/UL (ref 150–450)
POTASSIUM SERPL-SCNC: 4.2 MMOL/L (ref 3.5–5.2)
PROT SERPL-MCNC: 5.8 G/DL (ref 6–8.5)
PROT UR-MCNC: 41.2 MG/DL
PROT/CREAT UR: 215 MG/G CREAT (ref 0–200)
RBC # BLD AUTO: 4.57 X10E6/UL (ref 3.77–5.28)
SODIUM SERPL-SCNC: 136 MMOL/L (ref 134–144)
URATE SERPL-MCNC: 5.5 MG/DL (ref 2.6–6.2)
WBC # BLD AUTO: 10.3 X10E3/UL (ref 3.4–10.8)

## 2024-05-13 ENCOUNTER — HOSPITAL ENCOUNTER (OUTPATIENT)
Facility: HOSPITAL | Age: 29
Discharge: HOME | End: 2024-05-13
Attending: OBSTETRICS & GYNECOLOGY | Admitting: OBSTETRICS & GYNECOLOGY
Payer: COMMERCIAL

## 2024-05-13 VITALS
WEIGHT: 247 LBS | TEMPERATURE: 97.5 F | HEIGHT: 63 IN | HEART RATE: 91 BPM | RESPIRATION RATE: 16 BRPM | BODY MASS INDEX: 43.77 KG/M2 | DIASTOLIC BLOOD PRESSURE: 82 MMHG | SYSTOLIC BLOOD PRESSURE: 129 MMHG

## 2024-05-13 LAB
ALBUMIN SERPL-MCNC: 3.3 G/DL (ref 3.5–5.2)
ALBUMIN/GLOB SERPL: 1.2 G/DL
ALP SERPL-CCNC: 203 U/L (ref 39–117)
ALT SERPL W P-5'-P-CCNC: 97 U/L (ref 1–33)
ANION GAP SERPL CALCULATED.3IONS-SCNC: 12 MMOL/L (ref 5–15)
AST SERPL-CCNC: 68 U/L (ref 1–32)
BASOPHILS # BLD AUTO: 0.03 10*3/MM3 (ref 0–0.2)
BASOPHILS NFR BLD AUTO: 0.3 % (ref 0–1.5)
BILIRUB SERPL-MCNC: 0.3 MG/DL (ref 0–1.2)
BUN SERPL-MCNC: 8 MG/DL (ref 6–20)
BUN/CREAT SERPL: 15.7 (ref 7–25)
CALCIUM SPEC-SCNC: 8.5 MG/DL (ref 8.6–10.5)
CHLORIDE SERPL-SCNC: 104 MMOL/L (ref 98–107)
CO2 SERPL-SCNC: 20 MMOL/L (ref 22–29)
CREAT SERPL-MCNC: 0.51 MG/DL (ref 0.57–1)
DEPRECATED RDW RBC AUTO: 42.8 FL (ref 37–54)
EGFRCR SERPLBLD CKD-EPI 2021: 129.8 ML/MIN/1.73
EOSINOPHIL # BLD AUTO: 0.04 10*3/MM3 (ref 0–0.4)
EOSINOPHIL NFR BLD AUTO: 0.4 % (ref 0.3–6.2)
ERYTHROCYTE [DISTWIDTH] IN BLOOD BY AUTOMATED COUNT: 13.2 % (ref 12.3–15.4)
EXPIRATION DATE: ABNORMAL
GLOBULIN UR ELPH-MCNC: 2.8 GM/DL
GLUCOSE SERPL-MCNC: 98 MG/DL (ref 65–99)
HAV IGM SERPL QL IA: NORMAL
HBV CORE IGM SERPL QL IA: NORMAL
HBV SURFACE AG SERPL QL IA: NORMAL
HCT VFR BLD AUTO: 40.3 % (ref 34–46.6)
HCV AB SER QL: NORMAL
HGB BLD-MCNC: 13.6 G/DL (ref 12–15.9)
IMM GRANULOCYTES # BLD AUTO: 0.1 10*3/MM3 (ref 0–0.05)
IMM GRANULOCYTES NFR BLD AUTO: 1 % (ref 0–0.5)
LDH SERPL-CCNC: 184 U/L (ref 135–214)
LYMPHOCYTES # BLD AUTO: 1.92 10*3/MM3 (ref 0.7–3.1)
LYMPHOCYTES NFR BLD AUTO: 20.1 % (ref 19.6–45.3)
Lab: ABNORMAL
MCH RBC QN AUTO: 29.7 PG (ref 26.6–33)
MCHC RBC AUTO-ENTMCNC: 33.7 G/DL (ref 31.5–35.7)
MCV RBC AUTO: 88 FL (ref 79–97)
MONOCYTES # BLD AUTO: 1.05 10*3/MM3 (ref 0.1–0.9)
MONOCYTES NFR BLD AUTO: 11 % (ref 5–12)
NEUTROPHILS NFR BLD AUTO: 6.43 10*3/MM3 (ref 1.7–7)
NEUTROPHILS NFR BLD AUTO: 67.2 % (ref 42.7–76)
NRBC BLD AUTO-RTO: 0 /100 WBC (ref 0–0.2)
PLATELET # BLD AUTO: 280 10*3/MM3 (ref 140–450)
PMV BLD AUTO: 9.3 FL (ref 6–12)
POTASSIUM SERPL-SCNC: 3.7 MMOL/L (ref 3.5–5.2)
PROT SERPL-MCNC: 6.1 G/DL (ref 6–8.5)
PROT UR STRIP-MCNC: ABNORMAL MG/DL
RBC # BLD AUTO: 4.58 10*6/MM3 (ref 3.77–5.28)
SODIUM SERPL-SCNC: 136 MMOL/L (ref 136–145)
URATE SERPL-MCNC: 7.4 MG/DL (ref 2.4–5.7)
WBC NRBC COR # BLD AUTO: 9.57 10*3/MM3 (ref 3.4–10.8)

## 2024-05-13 PROCEDURE — 80074 ACUTE HEPATITIS PANEL: CPT | Performed by: OBSTETRICS & GYNECOLOGY

## 2024-05-13 PROCEDURE — 85025 COMPLETE CBC W/AUTO DIFF WBC: CPT | Performed by: OBSTETRICS & GYNECOLOGY

## 2024-05-13 PROCEDURE — 84550 ASSAY OF BLOOD/URIC ACID: CPT | Performed by: OBSTETRICS & GYNECOLOGY

## 2024-05-13 PROCEDURE — 84156 ASSAY OF PROTEIN URINE: CPT | Performed by: OBSTETRICS & GYNECOLOGY

## 2024-05-13 PROCEDURE — G0463 HOSPITAL OUTPT CLINIC VISIT: HCPCS

## 2024-05-13 PROCEDURE — G0378 HOSPITAL OBSERVATION PER HR: HCPCS

## 2024-05-13 PROCEDURE — 83615 LACTATE (LD) (LDH) ENZYME: CPT | Performed by: OBSTETRICS & GYNECOLOGY

## 2024-05-13 PROCEDURE — 82570 ASSAY OF URINE CREATININE: CPT | Performed by: OBSTETRICS & GYNECOLOGY

## 2024-05-13 PROCEDURE — 36415 COLL VENOUS BLD VENIPUNCTURE: CPT | Performed by: OBSTETRICS & GYNECOLOGY

## 2024-05-13 PROCEDURE — 81002 URINALYSIS NONAUTO W/O SCOPE: CPT | Performed by: OBSTETRICS & GYNECOLOGY

## 2024-05-13 PROCEDURE — 80053 COMPREHEN METABOLIC PANEL: CPT | Performed by: OBSTETRICS & GYNECOLOGY

## 2024-05-13 NOTE — NURSING NOTE
Patient educated on S/S of labor, educated to return to L&D for consistent, painful contractions, leaking of fluid, vaginal bleeding, or decreased fetal movement. Urgent maternal warnings signs reviewed and handout given.    Mirtha Reyes RN  18:02 CDT

## 2024-05-13 NOTE — NURSING NOTE
Received for monitoring and labs. Hypertensive at Dr. Carmichael's office. Pt denies headache or visual problems. Patellar reflexes 2+ bilaterally, negative for clonus. Uterine irritability noted. Pt denies any pain.
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
## 2024-05-14 ENCOUNTER — TELEPHONE (OUTPATIENT)
Dept: OBSTETRICS AND GYNECOLOGY | Age: 29
End: 2024-05-14

## 2024-05-14 LAB
C TRACH RRNA SPEC QL NAA+PROBE: NEGATIVE
CREAT UR-MCNC: 233.9 MG/DL
GP B STREP DNA SPEC QL NAA+PROBE: NEGATIVE
N GONORRHOEA RRNA SPEC QL NAA+PROBE: NEGATIVE
PROT ?TM UR-MCNC: 54.9 MG/DL
PROT/CREAT UR: 234.7 MG/G CREA (ref 0–200)
T VAGINALIS RRNA SPEC QL NAA+PROBE: NEGATIVE

## 2024-05-14 NOTE — TELEPHONE ENCOUNTER
JOE CASTANEDA     [phone]    PT STATES SHE HAS MISSED SEVERAL CALLS RE: LABS    PT HAD LABS @ L&D YESTERDAY WAS TOLD EVERYTHING WAS GOOD W/THEM. NOTES IN CHART.

## 2024-05-15 ENCOUNTER — ROUTINE PRENATAL (OUTPATIENT)
Age: 29
End: 2024-05-15
Payer: COMMERCIAL

## 2024-05-15 VITALS — SYSTOLIC BLOOD PRESSURE: 142 MMHG | DIASTOLIC BLOOD PRESSURE: 98 MMHG | WEIGHT: 247.3 LBS | BODY MASS INDEX: 43.81 KG/M2

## 2024-05-15 DIAGNOSIS — Z3A.37 37 WEEKS GESTATION OF PREGNANCY: Primary | ICD-10-CM

## 2024-05-15 DIAGNOSIS — O14.00 ANTEPARTUM MILD PREECLAMPSIA: ICD-10-CM

## 2024-05-15 LAB
GLUCOSE UR STRIP-MCNC: NEGATIVE MG/DL
PROT UR STRIP-MCNC: NEGATIVE MG/DL

## 2024-05-15 NOTE — PROGRESS NOTES
Doing ok. Denies PIH symptoms. LFTs and uric acid mildly elevated on Monday. BP today 142/98 however pt reports normal BP at home. Gfm. Denies VB, LOF, ctx.     /98   Wt 112 kg (247 lb 4.8 oz)   LMP  (LMP Unknown)   BMI 43.81 kg/m²    FHTs 168  Urine protein and glucose negative  Cervix: closed/thick    Diagnoses and all orders for this visit:    1. 37 weeks gestation of pregnancy (Primary)  -     POC Urinalysis Dipstick    2. Antepartum mild preeclampsia  -     CBC Auto Differential  -     Comprehensive Metabolic Panel  -     Uric Acid  -     Lactate Dehydrogenase  IUP at 37 weeks gestation with mild preeclampsia. Labs mildly abnormal on Monday. Repeat today. RTC Friday for Bpp and follow up. PIH precautions. Delivery at 38 weeks gestation.

## 2024-05-16 LAB
ALBUMIN SERPL-MCNC: 3.1 G/DL (ref 4–5)
ALBUMIN/GLOB SERPL: 1.2 {RATIO} (ref 1.2–2.2)
ALP SERPL-CCNC: 211 IU/L (ref 44–121)
ALT SERPL-CCNC: 68 IU/L (ref 0–32)
AST SERPL-CCNC: 31 IU/L (ref 0–40)
BASOPHILS # BLD AUTO: 0 X10E3/UL (ref 0–0.2)
BASOPHILS NFR BLD AUTO: 0 %
BILIRUB SERPL-MCNC: <0.2 MG/DL (ref 0–1.2)
BUN SERPL-MCNC: 7 MG/DL (ref 6–20)
BUN/CREAT SERPL: 12 (ref 9–23)
CALCIUM SERPL-MCNC: 9.1 MG/DL (ref 8.7–10.2)
CHLORIDE SERPL-SCNC: 103 MMOL/L (ref 96–106)
CO2 SERPL-SCNC: 17 MMOL/L (ref 20–29)
CREAT SERPL-MCNC: 0.57 MG/DL (ref 0.57–1)
EGFRCR SERPLBLD CKD-EPI 2021: 126 ML/MIN/1.73
EOSINOPHIL # BLD AUTO: 0 X10E3/UL (ref 0–0.4)
EOSINOPHIL NFR BLD AUTO: 0 %
ERYTHROCYTE [DISTWIDTH] IN BLOOD BY AUTOMATED COUNT: 13.4 % (ref 11.7–15.4)
GLOBULIN SER CALC-MCNC: 2.5 G/DL (ref 1.5–4.5)
GLUCOSE SERPL-MCNC: 123 MG/DL (ref 70–99)
HCT VFR BLD AUTO: 43.2 % (ref 34–46.6)
HGB BLD-MCNC: 14.2 G/DL (ref 11.1–15.9)
IMM GRANULOCYTES # BLD AUTO: 0.1 X10E3/UL (ref 0–0.1)
IMM GRANULOCYTES NFR BLD AUTO: 1 %
LDH SERPL L TO P-CCNC: 185 IU/L (ref 119–226)
LYMPHOCYTES # BLD AUTO: 2.1 X10E3/UL (ref 0.7–3.1)
LYMPHOCYTES NFR BLD AUTO: 20 %
MCH RBC QN AUTO: 29.6 PG (ref 26.6–33)
MCHC RBC AUTO-ENTMCNC: 32.9 G/DL (ref 31.5–35.7)
MCV RBC AUTO: 90 FL (ref 79–97)
MONOCYTES # BLD AUTO: 0.8 X10E3/UL (ref 0.1–0.9)
MONOCYTES NFR BLD AUTO: 8 %
NEUTROPHILS # BLD AUTO: 7.6 X10E3/UL (ref 1.4–7)
NEUTROPHILS NFR BLD AUTO: 71 %
PLATELET # BLD AUTO: 268 X10E3/UL (ref 150–450)
POTASSIUM SERPL-SCNC: 3.8 MMOL/L (ref 3.5–5.2)
PROT SERPL-MCNC: 5.6 G/DL (ref 6–8.5)
RBC # BLD AUTO: 4.8 X10E6/UL (ref 3.77–5.28)
SODIUM SERPL-SCNC: 136 MMOL/L (ref 134–144)
URATE SERPL-MCNC: 5.7 MG/DL (ref 2.6–6.2)
WBC # BLD AUTO: 10.6 X10E3/UL (ref 3.4–10.8)

## 2024-05-17 ENCOUNTER — ROUTINE PRENATAL (OUTPATIENT)
Age: 29
End: 2024-05-17
Payer: COMMERCIAL

## 2024-05-17 ENCOUNTER — PATIENT OUTREACH (OUTPATIENT)
Dept: LABOR AND DELIVERY | Facility: HOSPITAL | Age: 29
End: 2024-05-17
Payer: COMMERCIAL

## 2024-05-17 ENCOUNTER — HOSPITAL ENCOUNTER (INPATIENT)
Facility: HOSPITAL | Age: 29
LOS: 5 days | Discharge: HOME | End: 2024-05-22
Attending: OBSTETRICS & GYNECOLOGY | Admitting: OBSTETRICS & GYNECOLOGY
Payer: COMMERCIAL

## 2024-05-17 VITALS — DIASTOLIC BLOOD PRESSURE: 98 MMHG | WEIGHT: 243.7 LBS | SYSTOLIC BLOOD PRESSURE: 144 MMHG | BODY MASS INDEX: 43.17 KG/M2

## 2024-05-17 DIAGNOSIS — O14.00 ANTEPARTUM MILD PREECLAMPSIA: ICD-10-CM

## 2024-05-17 DIAGNOSIS — Z3A.37 37 WEEKS GESTATION OF PREGNANCY: Primary | ICD-10-CM

## 2024-05-17 PROBLEM — O16.3 HYPERTENSION AFFECTING PREGNANCY IN THIRD TRIMESTER: Status: ACTIVE | Noted: 2024-05-17

## 2024-05-17 PROBLEM — O13.3 GESTATIONAL HYPERTENSION W/O SIGNIFICANT PROTEINURIA IN 3RD TRIMESTER: Status: ACTIVE | Noted: 2024-05-17

## 2024-05-17 LAB
ABO GROUP BLD: NORMAL
ALBUMIN SERPL-MCNC: 3.3 G/DL (ref 3.5–5.2)
ALBUMIN/GLOB SERPL: 1.1 G/DL
ALP SERPL-CCNC: 208 U/L (ref 39–117)
ALT SERPL W P-5'-P-CCNC: 45 U/L (ref 1–33)
ANION GAP SERPL CALCULATED.3IONS-SCNC: 11 MMOL/L (ref 5–15)
AST SERPL-CCNC: 27 U/L (ref 1–32)
BACTERIA UR QL AUTO: ABNORMAL /HPF
BASOPHILS # BLD MANUAL: 0 10*3/MM3 (ref 0–0.2)
BASOPHILS NFR BLD MANUAL: 0 % (ref 0–1.5)
BILIRUB SERPL-MCNC: 0.2 MG/DL (ref 0–1.2)
BILIRUB UR QL STRIP: ABNORMAL
BLD GP AB SCN SERPL QL: NEGATIVE
BUN SERPL-MCNC: 8 MG/DL (ref 6–20)
BUN/CREAT SERPL: 16 (ref 7–25)
CALCIUM SPEC-SCNC: 8.7 MG/DL (ref 8.6–10.5)
CHLORIDE SERPL-SCNC: 103 MMOL/L (ref 98–107)
CLARITY UR: ABNORMAL
CO2 SERPL-SCNC: 20 MMOL/L (ref 22–29)
COLOR UR: ABNORMAL
CREAT SERPL-MCNC: 0.5 MG/DL (ref 0.57–1)
CREAT UR-MCNC: 176.1 MG/DL
DEPRECATED RDW RBC AUTO: 43.4 FL (ref 37–54)
EGFRCR SERPLBLD CKD-EPI 2021: 130.4 ML/MIN/1.73
EOSINOPHIL # BLD MANUAL: 0 10*3/MM3 (ref 0–0.4)
EOSINOPHIL NFR BLD MANUAL: 0 % (ref 0.3–6.2)
ERYTHROCYTE [DISTWIDTH] IN BLOOD BY AUTOMATED COUNT: 13.3 % (ref 12.3–15.4)
GLOBULIN UR ELPH-MCNC: 3 GM/DL
GLUCOSE BLDC GLUCOMTR-MCNC: 108 MG/DL (ref 70–130)
GLUCOSE BLDC GLUCOMTR-MCNC: 126 MG/DL (ref 70–130)
GLUCOSE BLDC GLUCOMTR-MCNC: 98 MG/DL (ref 70–130)
GLUCOSE SERPL-MCNC: 96 MG/DL (ref 65–99)
GLUCOSE UR STRIP-MCNC: NEGATIVE MG/DL
GLUCOSE UR STRIP-MCNC: NEGATIVE MG/DL
HCT VFR BLD AUTO: 41.9 % (ref 34–46.6)
HGB BLD-MCNC: 14.2 G/DL (ref 12–15.9)
HGB UR QL STRIP.AUTO: NEGATIVE
KETONES UR QL STRIP: NEGATIVE
LDH SERPL-CCNC: 179 U/L (ref 135–214)
LEUKOCYTE ESTERASE UR QL STRIP.AUTO: ABNORMAL
LYMPHOCYTES # BLD MANUAL: 1.76 10*3/MM3 (ref 0.7–3.1)
LYMPHOCYTES NFR BLD MANUAL: 10 % (ref 5–12)
MCH RBC QN AUTO: 30.2 PG (ref 26.6–33)
MCHC RBC AUTO-ENTMCNC: 33.9 G/DL (ref 31.5–35.7)
MCV RBC AUTO: 89.1 FL (ref 79–97)
METAMYELOCYTES NFR BLD MANUAL: 1 % (ref 0–0)
MONOCYTES # BLD: 1.1 10*3/MM3 (ref 0.1–0.9)
NEUTROPHILS # BLD AUTO: 8.02 10*3/MM3 (ref 1.7–7)
NEUTROPHILS NFR BLD MANUAL: 62 % (ref 42.7–76)
NEUTS BAND NFR BLD MANUAL: 11 % (ref 0–5)
NITRITE UR QL STRIP: NEGATIVE
PH UR STRIP.AUTO: 5.5 [PH] (ref 5–8)
PLAT MORPH BLD: NORMAL
PLATELET # BLD AUTO: 291 10*3/MM3 (ref 140–450)
PMV BLD AUTO: 9.4 FL (ref 6–12)
POTASSIUM SERPL-SCNC: 4 MMOL/L (ref 3.5–5.2)
PROT ?TM UR-MCNC: 35.8 MG/DL
PROT SERPL-MCNC: 6.3 G/DL (ref 6–8.5)
PROT UR QL STRIP: ABNORMAL
PROT UR STRIP-MCNC: ABNORMAL MG/DL
PROT/CREAT UR: 203.3 MG/G CREA (ref 0–200)
RBC # BLD AUTO: 4.7 10*6/MM3 (ref 3.77–5.28)
RBC # UR STRIP: ABNORMAL /HPF
RBC MORPH BLD: NORMAL
REF LAB TEST METHOD: ABNORMAL
RH BLD: POSITIVE
SODIUM SERPL-SCNC: 134 MMOL/L (ref 136–145)
SP GR UR STRIP: 1.02 (ref 1–1.03)
SQUAMOUS #/AREA URNS HPF: ABNORMAL /HPF
T&S EXPIRATION DATE: NORMAL
URATE SERPL-MCNC: 6.3 MG/DL (ref 2.4–5.7)
UROBILINOGEN UR QL STRIP: ABNORMAL
VARIANT LYMPHS NFR BLD MANUAL: 12 % (ref 19.6–45.3)
VARIANT LYMPHS NFR BLD MANUAL: 4 % (ref 0–5)
WBC # UR STRIP: ABNORMAL /HPF
WBC MORPH BLD: NORMAL
WBC NRBC COR # BLD AUTO: 10.98 10*3/MM3 (ref 3.4–10.8)

## 2024-05-17 PROCEDURE — 82948 REAGENT STRIP/BLOOD GLUCOSE: CPT

## 2024-05-17 PROCEDURE — 81001 URINALYSIS AUTO W/SCOPE: CPT | Performed by: OBSTETRICS & GYNECOLOGY

## 2024-05-17 PROCEDURE — 85007 BL SMEAR W/DIFF WBC COUNT: CPT | Performed by: OBSTETRICS & GYNECOLOGY

## 2024-05-17 PROCEDURE — 86850 RBC ANTIBODY SCREEN: CPT | Performed by: OBSTETRICS & GYNECOLOGY

## 2024-05-17 PROCEDURE — 85027 COMPLETE CBC AUTOMATED: CPT | Performed by: OBSTETRICS & GYNECOLOGY

## 2024-05-17 PROCEDURE — 86780 TREPONEMA PALLIDUM: CPT | Performed by: OBSTETRICS & GYNECOLOGY

## 2024-05-17 PROCEDURE — 84156 ASSAY OF PROTEIN URINE: CPT | Performed by: OBSTETRICS & GYNECOLOGY

## 2024-05-17 PROCEDURE — 83615 LACTATE (LD) (LDH) ENZYME: CPT | Performed by: OBSTETRICS & GYNECOLOGY

## 2024-05-17 PROCEDURE — 84550 ASSAY OF BLOOD/URIC ACID: CPT | Performed by: OBSTETRICS & GYNECOLOGY

## 2024-05-17 PROCEDURE — 86901 BLOOD TYPING SEROLOGIC RH(D): CPT | Performed by: OBSTETRICS & GYNECOLOGY

## 2024-05-17 PROCEDURE — 86900 BLOOD TYPING SEROLOGIC ABO: CPT | Performed by: OBSTETRICS & GYNECOLOGY

## 2024-05-17 PROCEDURE — 82570 ASSAY OF URINE CREATININE: CPT | Performed by: OBSTETRICS & GYNECOLOGY

## 2024-05-17 PROCEDURE — 80053 COMPREHEN METABOLIC PANEL: CPT | Performed by: OBSTETRICS & GYNECOLOGY

## 2024-05-17 RX ORDER — MISOPROSTOL 200 UG/1
800 TABLET ORAL AS NEEDED
Status: DISCONTINUED | OUTPATIENT
Start: 2024-05-17 | End: 2024-05-20 | Stop reason: SDUPTHER

## 2024-05-17 RX ORDER — SODIUM CHLORIDE, SODIUM LACTATE, POTASSIUM CHLORIDE, CALCIUM CHLORIDE 600; 310; 30; 20 MG/100ML; MG/100ML; MG/100ML; MG/100ML
125 INJECTION, SOLUTION INTRAVENOUS CONTINUOUS
Status: DISCONTINUED | OUTPATIENT
Start: 2024-05-17 | End: 2024-05-22 | Stop reason: HOSPADM

## 2024-05-17 RX ORDER — CALCIUM CARBONATE 500 MG/1
2 TABLET, CHEWABLE ORAL 3 TIMES DAILY PRN
Status: DISCONTINUED | OUTPATIENT
Start: 2024-05-17 | End: 2024-05-20 | Stop reason: SDUPTHER

## 2024-05-17 RX ORDER — METHYLERGONOVINE MALEATE 0.2 MG/ML
200 INJECTION INTRAVENOUS ONCE AS NEEDED
Status: DISCONTINUED | OUTPATIENT
Start: 2024-05-17 | End: 2024-05-20 | Stop reason: SDUPTHER

## 2024-05-17 RX ORDER — ACETAMINOPHEN 325 MG/1
650 TABLET ORAL EVERY 4 HOURS PRN
Status: DISCONTINUED | OUTPATIENT
Start: 2024-05-17 | End: 2024-05-20

## 2024-05-17 RX ORDER — NIFEDIPINE 30 MG/1
60 TABLET, EXTENDED RELEASE ORAL NIGHTLY
Status: DISCONTINUED | OUTPATIENT
Start: 2024-05-17 | End: 2024-05-22 | Stop reason: HOSPADM

## 2024-05-17 RX ORDER — MISOPROSTOL 100 MCG
25 TABLET ORAL EVERY 4 HOURS
Status: COMPLETED | OUTPATIENT
Start: 2024-05-17 | End: 2024-05-18

## 2024-05-17 RX ORDER — IBUPROFEN 600 MG/1
600 TABLET ORAL EVERY 6 HOURS PRN
Status: DISCONTINUED | OUTPATIENT
Start: 2024-05-17 | End: 2024-05-20

## 2024-05-17 RX ORDER — TERBUTALINE SULFATE 1 MG/ML
0.25 INJECTION, SOLUTION SUBCUTANEOUS AS NEEDED
Status: DISCONTINUED | OUTPATIENT
Start: 2024-05-17 | End: 2024-05-22 | Stop reason: HOSPADM

## 2024-05-17 RX ORDER — ONDANSETRON 2 MG/ML
4 INJECTION INTRAMUSCULAR; INTRAVENOUS EVERY 6 HOURS PRN
Status: DISCONTINUED | OUTPATIENT
Start: 2024-05-17 | End: 2024-05-20 | Stop reason: SDUPTHER

## 2024-05-17 RX ORDER — OXYTOCIN/0.9 % SODIUM CHLORIDE 30/500 ML
250 PLASTIC BAG, INJECTION (ML) INTRAVENOUS CONTINUOUS
Status: ACTIVE | OUTPATIENT
Start: 2024-05-17 | End: 2024-05-17

## 2024-05-17 RX ORDER — VENLAFAXINE HYDROCHLORIDE 75 MG/1
225 CAPSULE, EXTENDED RELEASE ORAL NIGHTLY
Status: DISCONTINUED | OUTPATIENT
Start: 2024-05-17 | End: 2024-05-22 | Stop reason: HOSPADM

## 2024-05-17 RX ORDER — SODIUM CHLORIDE 0.9 % (FLUSH) 0.9 %
10 SYRINGE (ML) INJECTION AS NEEDED
Status: DISCONTINUED | OUTPATIENT
Start: 2024-05-17 | End: 2024-05-22 | Stop reason: HOSPADM

## 2024-05-17 RX ORDER — CARBOPROST TROMETHAMINE 250 UG/ML
250 INJECTION, SOLUTION INTRAMUSCULAR AS NEEDED
Status: DISCONTINUED | OUTPATIENT
Start: 2024-05-17 | End: 2024-05-22 | Stop reason: HOSPADM

## 2024-05-17 RX ORDER — OXYTOCIN/0.9 % SODIUM CHLORIDE 30/500 ML
999 PLASTIC BAG, INJECTION (ML) INTRAVENOUS ONCE
Status: DISCONTINUED | OUTPATIENT
Start: 2024-05-17 | End: 2024-05-22 | Stop reason: HOSPADM

## 2024-05-17 RX ORDER — CITRIC ACID/SODIUM CITRATE 334-500MG
30 SOLUTION, ORAL ORAL ONCE AS NEEDED
Status: DISCONTINUED | OUTPATIENT
Start: 2024-05-17 | End: 2024-05-22 | Stop reason: HOSPADM

## 2024-05-17 RX ORDER — SODIUM CHLORIDE 0.9 % (FLUSH) 0.9 %
10 SYRINGE (ML) INJECTION EVERY 12 HOURS SCHEDULED
Status: DISCONTINUED | OUTPATIENT
Start: 2024-05-17 | End: 2024-05-22 | Stop reason: HOSPADM

## 2024-05-17 RX ORDER — ONDANSETRON 4 MG/1
4 TABLET, ORALLY DISINTEGRATING ORAL EVERY 6 HOURS PRN
Status: DISCONTINUED | OUTPATIENT
Start: 2024-05-17 | End: 2024-05-20 | Stop reason: SDUPTHER

## 2024-05-17 RX ORDER — VENLAFAXINE HYDROCHLORIDE 75 MG/1
225 CAPSULE, EXTENDED RELEASE ORAL
Status: DISCONTINUED | OUTPATIENT
Start: 2024-05-17 | End: 2024-05-17

## 2024-05-17 RX ORDER — SODIUM CHLORIDE 9 MG/ML
40 INJECTION, SOLUTION INTRAVENOUS AS NEEDED
Status: DISCONTINUED | OUTPATIENT
Start: 2024-05-17 | End: 2024-05-22 | Stop reason: HOSPADM

## 2024-05-17 RX ADMIN — Medication 25 MCG: at 15:05

## 2024-05-17 RX ADMIN — Medication 25 MCG: at 19:33

## 2024-05-17 RX ADMIN — VENLAFAXINE HYDROCHLORIDE 225 MG: 75 CAPSULE, EXTENDED RELEASE ORAL at 21:09

## 2024-05-17 RX ADMIN — NIFEDIPINE 60 MG: 60 TABLET, FILM COATED, EXTENDED RELEASE ORAL at 21:09

## 2024-05-17 RX ADMIN — Medication 10 ML: at 17:14

## 2024-05-17 RX ADMIN — Medication 25 MCG: at 23:58

## 2024-05-17 RX ADMIN — Medication 10 ML: at 21:08

## 2024-05-17 NOTE — PROGRESS NOTES
Sherman Jensen MD  [facility]  [address]  [place] [address]  Office [phone]  Fax [phone]       Ifrah Menchaca  : 1995  MRN: [id number]  CSN: [id number]    Labor progress note    Subjective   She reports is having no problems. Denies preeclampsia symptoms currently. Denies contractions, leakage of fluid or vaginal bleeding.      Objective   Min/max vitals past 24 hours:  No data recorded   BP  Min: 128/85  Max: 144/98   Pulse  Min: 89  Max: 98   No data recorded        FHT's: reactive, reassuring and category 1.  external monitors used   Cervix: was not checked.   Contractions: Uterine irritability        Result Review   Preelampsia Labs:    Results from last 7 days   Lab Units 24  1143 05/15/24  0841 24  1521   WBC 10*3/mm3 10.98*   < >  --    HEMOGLOBIN g/dL 14.2   < >  --    HEMATOCRIT % 41.9   < >  --    PLATELETS 10*3/mm3 291   < >  --    POTASSIUM mmol/L 4.0   < >  --    ALT (SGPT) U/L 45*   < >  --    AST (SGOT) U/L 27   < >  --    CREATININE mg/dL 0.50*   < >  --    BILIRUBIN mg/dL 0.2   < >  --    LDH U/L 179   < >  --    URIC ACID mg/dL 6.3*   < >  --    PROT/CREAT RATIO UR mg/G Crea  --   --  234.7*    < > = values in this interval not displayed.              Assessment   IUP at 37w2d  Gestational hypertension  Gestational diabetes, diet controlled  Late prenatal care  GBS negative     Plan   Cervical ripening - oral cytotec 25 mcg every 4 hours  Pitocin per protocol/AROM as needed   Epidural at patient request  Continue home medications  Ok for diet  Glucose checks: fasting and 1 hour after meals  Monitor for severe symptoms of preeclampsia  Allow labor to continue pending maternal and fetal status  Plan discussed with family and questions answered.  Understanding verbalized.    Sherman Jensen MD  2024  13:09 CDT

## 2024-05-17 NOTE — OUTREACH NOTE
[facility]  IP Antepartum    Current Estimated Gestational Age: 37w2d      Questions/Answers      Flowsheet Row Responses   Best Method for Contacting Cell   Support Person Present Yes   Community Resources/Benefits currently in use: WIC   Understanding of diagnosis/reason for admission: Patient understands, no questions or concerns at this time   Additional Comments: admitted for induction of labor          At Vanessa's bedside.  Family with her in room.  Reviewed plan of care.  States she has all necessary items at home for herself and baby.      Anjali Ramirez RN  Maternity Nurse Navigator    5/17/2024, 15:31 CDT

## 2024-05-17 NOTE — PROGRESS NOTES
No complaints. GFM. Denies HA, VB, LOF, ctx. BPP 8/8    /98   Wt 111 kg (243 lb 11.2 oz)   LMP  (LMP Unknown)   BMI 43.17 kg/m²    FHTs 144  Urine protein trace, urine glucose negative    Diagnoses and all orders for this visit:    1. 37 weeks gestation of pregnancy (Primary)  -     POC Urinalysis Dipstick  IUP at 37 weeks gestation, feeling good however BP still elevated. Labs had normalized. Pt with unfavorable cervix however with current BP and now trace UP, will send pt to L&D for labs and NST.  Suspect may need to proceed with delivery.   2. Antepartum mild preeclampsia

## 2024-05-17 NOTE — PLAN OF CARE
Problem: Adult Inpatient Plan of Care  Goal: Plan of Care Review  Outcome: Ongoing, Progressing  Flowsheets (Taken 2024 1706)  Progress: improving  Plan of Care Reviewed With:   patient   family  Outcome Evaluation:   PIH, Induction with cytotec tonight.  Patient may continue regular diet and be IID.  Goal: Patient-Specific Goal (Individualized)  Outcome: Ongoing, Progressing  Goal: Absence of Hospital-Acquired Illness or Injury  Outcome: Ongoing, Progressing  Goal: Optimal Comfort and Wellbeing  Outcome: Ongoing, Progressing  Goal: Readiness for Transition of Care  Outcome: Ongoing, Progressing  Intervention: Mutually Develop Transition Plan  Recent Flowsheet Documentation  Taken 2024 1149 by Nunu Mendoza, RN  Equipment Currently Used at Home: none     Problem: Bleeding (Labor)  Goal: Hemostasis  Outcome: Ongoing, Progressing     Problem: Change in Fetal Wellbeing (Labor)  Goal: Stable Fetal Wellbeing  Outcome: Ongoing, Progressing     Problem: Delayed Labor Progression (Labor)  Goal: Effective Progression to Delivery  Outcome: Ongoing, Progressing     Problem: Infection (Labor)  Goal: Absence of Infection Signs and Symptoms  Outcome: Ongoing, Progressing     Problem: Labor Pain (Labor)  Goal: Acceptable Pain Control  Outcome: Ongoing, Progressing     Problem: Uterine Tachysystole (Labor)  Goal: Normal Uterine Contraction Pattern  Outcome: Ongoing, Progressing   Goal Outcome Evaluation:  Plan of Care Reviewed With: patient, family        Progress: improving  Outcome Evaluation:   PIH, Induction with cytotec tonight.  Patient may continue regular diet and be IID.
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
## 2024-05-18 LAB
AMPHET+METHAMPHET UR QL: NEGATIVE
AMPHETAMINES UR QL: NEGATIVE
BARBITURATES UR QL SCN: NEGATIVE
BENZODIAZ UR QL SCN: NEGATIVE
BUPRENORPHINE SERPL-MCNC: NEGATIVE NG/ML
CANNABINOIDS SERPL QL: POSITIVE
COCAINE UR QL: NEGATIVE
FENTANYL UR-MCNC: NEGATIVE NG/ML
GLUCOSE BLDC GLUCOMTR-MCNC: 107 MG/DL (ref 70–130)
GLUCOSE BLDC GLUCOMTR-MCNC: 108 MG/DL (ref 70–130)
GLUCOSE BLDC GLUCOMTR-MCNC: 135 MG/DL (ref 70–130)
GLUCOSE BLDC GLUCOMTR-MCNC: 88 MG/DL (ref 70–130)
METHADONE UR QL SCN: NEGATIVE
OPIATES UR QL: NEGATIVE
OXYCODONE UR QL SCN: NEGATIVE
PCP UR QL SCN: NEGATIVE
T PALLIDUM IGG SER QL: NORMAL
TRICYCLICS UR QL SCN: NEGATIVE

## 2024-05-18 PROCEDURE — 82948 REAGENT STRIP/BLOOD GLUCOSE: CPT

## 2024-05-18 PROCEDURE — 80307 DRUG TEST PRSMV CHEM ANLYZR: CPT | Performed by: OBSTETRICS & GYNECOLOGY

## 2024-05-18 PROCEDURE — G0480 DRUG TEST DEF 1-7 CLASSES: HCPCS | Performed by: OBSTETRICS & GYNECOLOGY

## 2024-05-18 RX ORDER — OXYTOCIN/0.9 % SODIUM CHLORIDE 30/500 ML
2-6 PLASTIC BAG, INJECTION (ML) INTRAVENOUS
Status: DISCONTINUED | OUTPATIENT
Start: 2024-05-19 | End: 2024-05-19

## 2024-05-18 RX ORDER — OXYTOCIN/0.9 % SODIUM CHLORIDE 30/500 ML
2-20 PLASTIC BAG, INJECTION (ML) INTRAVENOUS
Status: DISCONTINUED | OUTPATIENT
Start: 2024-05-19 | End: 2024-05-19

## 2024-05-18 RX ADMIN — VENLAFAXINE HYDROCHLORIDE 225 MG: 75 CAPSULE, EXTENDED RELEASE ORAL at 20:58

## 2024-05-18 RX ADMIN — NIFEDIPINE 60 MG: 60 TABLET, FILM COATED, EXTENDED RELEASE ORAL at 20:58

## 2024-05-18 RX ADMIN — Medication 25 MCG: at 07:27

## 2024-05-18 RX ADMIN — Medication 10 ML: at 07:28

## 2024-05-18 RX ADMIN — Medication 10 ML: at 21:00

## 2024-05-18 RX ADMIN — Medication 25 MCG: at 03:31

## 2024-05-18 RX ADMIN — DINOPROSTONE 10 MG: 10 INSERT VAGINAL at 16:46

## 2024-05-18 RX ADMIN — Medication 25 MCG: at 11:30

## 2024-05-18 NOTE — PLAN OF CARE
Goal Outcome Evaluation:  Plan of Care Reviewed With: patient, family        Progress: no change  Outcome Evaluation:  PIH and GDM. IOL with cytotec overnight. Pt to recieve 6 doses per MD. Regular diet. IID. Up ad keri to restroom. Pt currently reporting mild cramping and rates her pain as a 1/10 on the pain scale. Pt sleeping between care periods overnight.

## 2024-05-18 NOTE — PLAN OF CARE
Problem: Adult Inpatient Plan of Care  Goal: Plan of Care Review  Outcome: Ongoing, Progressing  Flowsheets (Taken 2024 171)  Progress: improving  Plan of Care Reviewed With:   patient   mother   grandparent  Outcome Evaluation:  PIH and GDM  IOL s/p cytotec 25mcg x 6 doses.  Cervidil placed at 1700 to remain in place for 12 hours, then low dose pit to be held at 6 mu/min.  Up ad lip.  Regular diet.   Goal Outcome Evaluation:  Plan of Care Reviewed With: patient, mother, grandparent        Progress: improving  Outcome Evaluation:  PIH and GDM  IOL s/p cytotec 25mcg x 6 doses.  Cervidil placed at 1700 to remain in place for 12 hours, then low dose pit to be held at 6 mu/min.  Up ad lip.  Regular diet.

## 2024-05-18 NOTE — PROGRESS NOTES
Sherman Jensen MD  [facility]  [address]  [place] [address]  Office [phone]  Fax [phone]       Ifrah Menchaca  : 1995  MRN: [id number]  CSN: [id number]    Labor progress note    Subjective   She reports is having no problems. Denies preeclampsia symptoms including headache, vision changes or RUQ pain. Endorses appropriate fetal movement. Denies contractions, leakage of fluid or vaginal bleeding.      Objective   Min/max vitals past 24 hours:  Temp  Min: 97.7 °F (36.5 °C)  Max: 98.1 °F (36.7 °C)   BP  Min: 120/89  Max: 144/98   Pulse  Min: 79  Max: 110   Resp  Min: 15  Max: 18        General: Well Developed, Well Nourished, not in acute distress   HEENT: normocephalic, atraumatic, conjunctiva non-icteric    Respiratory: non-labored, symmetrical chest rise   Gastrointestinal: gravid, soft, no TTP, no rebound tenderness or guarding to palpation, no palpable ctx   Neurologic: alert and oriented, CN II-XII grossly intact without focal deficit, DTRs 2+ lower extremities, no clonus   Psychiatric: normal mood, pleasant, cooperative  Musculoskeletal: negative calf tenderness, trace lower extremity edema  Skin: warm & dry, no cyanosis, no jaundice    Pelvic Exam:  deferred        FHT's: reactive, reassuring and category 1.  external monitors used   Cervix: was not checked.   Contractions: Irritability on toco        Result Review   Preelampsia Labs:    Results from last 7 days   Lab Units 24  1144 24  1143   WBC 10*3/mm3  --  10.98*   HEMOGLOBIN g/dL  --  14.2   HEMATOCRIT %  --  41.9   PLATELETS 10*3/mm3  --  291   POTASSIUM mmol/L  --  4.0   ALT (SGPT) U/L  --  45*   AST (SGOT) U/L  --  27   CREATININE mg/dL  --  0.50*   BILIRUBIN mg/dL  --  0.2   LDH U/L  --  179   URIC ACID mg/dL  --  6.3*   PROT/CREAT RATIO UR mg/G Crea 203.3*  --       Latest Reference Range & Units 24 11:43 24 13:23 24 17:12 24 21:08 24 05:15   Glucose 70 -  130 mg/dL 96 108 126 98 108   (H): Data is abnormally high             Assessment   IUP at 37w3d  Preeclampsia without severe features  Gestational diabetes, diet controlled  Late prenatal care  GBS negative     Plan   Continue induction  S/p cytotec x4, continue cytotec x 2 more doses then reassess cervix  If cervix unchanged, trial of cervidil   Regular diet with glucose checks (fasting and 1-hour postprandial)  Monitor for severe symptoms of preeclampsia   SCDs while in bed  Ambulation encouraged, TID  Allow labor to continue pending maternal and fetal status  Plan discussed with family and questions answered.  Understanding verbalized.    Sherman Jensen MD  5/18/2024  07:02 CDT

## 2024-05-19 LAB
GLUCOSE BLDC GLUCOMTR-MCNC: 100 MG/DL (ref 70–130)
GLUCOSE BLDC GLUCOMTR-MCNC: 105 MG/DL (ref 70–130)
GLUCOSE BLDC GLUCOMTR-MCNC: 156 MG/DL (ref 70–130)

## 2024-05-19 PROCEDURE — 82948 REAGENT STRIP/BLOOD GLUCOSE: CPT

## 2024-05-19 PROCEDURE — 25810000003 LACTATED RINGERS PER 1000 ML: Performed by: OBSTETRICS & GYNECOLOGY

## 2024-05-19 PROCEDURE — 3E033VJ INTRODUCTION OF OTHER HORMONE INTO PERIPHERAL VEIN, PERCUTANEOUS APPROACH: ICD-10-PCS | Performed by: OBSTETRICS & GYNECOLOGY

## 2024-05-19 PROCEDURE — 25010000002 BUTORPHANOL PER 1 MG: Performed by: OBSTETRICS & GYNECOLOGY

## 2024-05-19 PROCEDURE — 10907ZC DRAINAGE OF AMNIOTIC FLUID, THERAPEUTIC FROM PRODUCTS OF CONCEPTION, VIA NATURAL OR ARTIFICIAL OPENING: ICD-10-PCS | Performed by: OBSTETRICS & GYNECOLOGY

## 2024-05-19 RX ORDER — OXYTOCIN/0.9 % SODIUM CHLORIDE 30/500 ML
2-20 PLASTIC BAG, INJECTION (ML) INTRAVENOUS
Status: DISCONTINUED | OUTPATIENT
Start: 2024-05-19 | End: 2024-05-20

## 2024-05-19 RX ADMIN — BUTORPHANOL TARTRATE 1 MG: 2 INJECTION, SOLUTION INTRAMUSCULAR; INTRAVENOUS at 23:41

## 2024-05-19 RX ADMIN — Medication 2 MILLI-UNITS/MIN: at 20:32

## 2024-05-19 RX ADMIN — SODIUM CHLORIDE, POTASSIUM CHLORIDE, SODIUM LACTATE AND CALCIUM CHLORIDE 125 ML/HR: 600; 310; 30; 20 INJECTION, SOLUTION INTRAVENOUS at 05:26

## 2024-05-19 RX ADMIN — Medication 2 MILLI-UNITS/MIN: at 05:25

## 2024-05-19 RX ADMIN — VENLAFAXINE HYDROCHLORIDE 225 MG: 75 CAPSULE, EXTENDED RELEASE ORAL at 21:01

## 2024-05-19 RX ADMIN — NIFEDIPINE 60 MG: 60 TABLET, FILM COATED, EXTENDED RELEASE ORAL at 21:01

## 2024-05-19 NOTE — PROGRESS NOTES
Sherman Jensen MD  [facility]  [address]  [place] [address]  Office [phone]  Fax [phone]       Ifrah Menchaca  : 1995  MRN: [id number]  CSN: [id number]    Labor progress note    Subjective   She reports is having no problems. Denies preeclampsia symptoms. Denies labor symptoms.        Objective   Min/max vitals past 24 hours:  Temp  Min: 97.7 °F (36.5 °C)  Max: 98.4 °F (36.9 °C)   BP  Min: 95/63  Max: 146/98   Pulse  Min: 66  Max: 94   Resp  Min: 14  Max: 16        FHT's: reactive, reassuring and category 1.  external monitors used   Cervix: was checked (by me): 1 cm / 70 % / -3, vertex    Contractions: regular every 3 minutes - external monitors used        Result Review   Results from last 7 days   Lab Units 24  1144 24  1143   WBC 10*3/mm3  --  10.98*   HEMOGLOBIN g/dL  --  14.2   HEMATOCRIT %  --  41.9   PLATELETS 10*3/mm3  --  291   POTASSIUM mmol/L  --  4.0   ALT (SGPT) U/L  --  45*   AST (SGOT) U/L  --  27   CREATININE mg/dL  --  0.50*   BILIRUBIN mg/dL  --  0.2   LDH U/L  --  179   URIC ACID mg/dL  --  6.3*   PROT/CREAT RATIO UR mg/G Crea 203.3*  --      Lab Results   Component Value Date    STREPGPB Negative 05/10/2024              Assessment   IUP at 37w4d  Preeclampsia without severe features  Gestational diabetes, diet controlled  Late prenatal care  GBS negative     Plan   AROM - clear fluid seen  Stop pitocin (currently on 18 milliunits/minute). If NST still reassuring, ok to eat and shower. Restart pitocin at 2030 per protocol. Plan for . Epidural at patient request.  Allow labor to continue pending maternal and fetal status  Plan discussed with family and questions answered.  Understanding verbalized.    Sherman Jensen MD  2024  18:33 CDT

## 2024-05-19 NOTE — NURSING NOTE
Cervidil to be removed at 0500 per order; Cervidil was not present to be removed. Pt denied removing it herself or feeling it come out during the night.

## 2024-05-19 NOTE — PLAN OF CARE
Goal Outcome Evaluation:  Plan of Care Reviewed With: patient        Progress: no change  Outcome Evaluation: Pt is  PIH and GDM. IOL with Cervidil through the night.Cervidil was not present to be removed at 0500; Low dose Pitocin started at 0525. Regular diet. LR running @ 125ml/Hr. Up ad keri to restroom. Pt currently reporting mild cramping and rates her pain as a 1-3/10 on the pain scale. Pt sleeping between care periods overnight.

## 2024-05-19 NOTE — PROGRESS NOTES
"    Sherman Jensen MD  [facility]  [address]  [place]  Office [phone]  Fax [phone]       Ifrah Menchaca  : 1995  MRN: [id number]  CSN: [id number]    Labor progress note    Subjective   She reports is having no problems. Denies preeclampsia symptoms this morning. Endorses fetal movement. Denies painful contractions, leakage of fluid or vaginal bleeding. Occasional \"cramp\" this morning since pitocin started.      Objective   Min/max vitals past 24 hours:  Temp  Min: 97.7 °F (36.5 °C)  Max: 98.4 °F (36.9 °C)   BP  Min: 95/63  Max: 146/98   Pulse  Min: 71  Max: 109   Resp  Min: 14  Max: 16        FHT's: reactive, reassuring and category 1.  external monitors used   Cervix: was checked (by me): 0.5 cm / 40 % / -3, vertex; no palpable/retained cervidil    Contractions: regular every 4 minutes - external monitors used        Result Review    Latest Reference Range & Units 24 05:15 24 10:35 24 14:27 24 19:25 24 05:55   Glucose 70 - 130 mg/dL 108 88 135 (H) 107 105   (H): Data is abnormally high         Assessment   IUP at 37w4d  Preeclampsia without severe features  Gestational diabetes, diet controlled  Late prenatal care  GBS negative     Plan   -Continue induction  -S/p cytotec x6, s/p cervidil x1 (cervidil out earlier than full 12 hours - was not present at time for removal, patient does not remember seeing it come out overnight; not present/retained on exam this morning)  -Pitocin per protocol  -NPO now as on pitocin protocol  -AROM PRN  -Possible cook catheter pending cervix  -epidural at patient request  -Monitor for severe symptoms of preeclampsia   -SCDs while in bed  -Ambulation encouraged  -Allow labor to continue pending maternal and fetal status  -Plan discussed with family and questions answered.  Understanding verbalized.    Sherman Jensen MD  2024  07:34 CDT            "

## 2024-05-20 ENCOUNTER — ANESTHESIA (OUTPATIENT)
Dept: LABOR AND DELIVERY | Facility: HOSPITAL | Age: 29
End: 2024-05-20
Payer: COMMERCIAL

## 2024-05-20 ENCOUNTER — ANESTHESIA EVENT (OUTPATIENT)
Dept: LABOR AND DELIVERY | Facility: HOSPITAL | Age: 29
End: 2024-05-20
Payer: COMMERCIAL

## 2024-05-20 PROCEDURE — 25010000002 MORPHINE PER 10 MG: Performed by: OBSTETRICS & GYNECOLOGY

## 2024-05-20 PROCEDURE — 0KQM0ZZ REPAIR PERINEUM MUSCLE, OPEN APPROACH: ICD-10-PCS | Performed by: OBSTETRICS & GYNECOLOGY

## 2024-05-20 PROCEDURE — 25010000002 BUTORPHANOL PER 1 MG: Performed by: OBSTETRICS & GYNECOLOGY

## 2024-05-20 PROCEDURE — 88313 SPECIAL STAINS GROUP 2: CPT | Performed by: OBSTETRICS & GYNECOLOGY

## 2024-05-20 PROCEDURE — 25810000003 LACTATED RINGERS PER 1000 ML: Performed by: OBSTETRICS & GYNECOLOGY

## 2024-05-20 PROCEDURE — 25010000002 TERBUTALINE PER 1 MG

## 2024-05-20 PROCEDURE — C1755 CATHETER, INTRASPINAL: HCPCS | Performed by: NURSE ANESTHETIST, CERTIFIED REGISTERED

## 2024-05-20 PROCEDURE — 59410 OBSTETRICAL CARE: CPT | Performed by: OBSTETRICS & GYNECOLOGY

## 2024-05-20 PROCEDURE — 25010000002 ROPIVACAINE PER 1 MG: Performed by: NURSE ANESTHETIST, CERTIFIED REGISTERED

## 2024-05-20 PROCEDURE — 88307 TISSUE EXAM BY PATHOLOGIST: CPT | Performed by: OBSTETRICS & GYNECOLOGY

## 2024-05-20 RX ORDER — BISACODYL 10 MG
10 SUPPOSITORY, RECTAL RECTAL DAILY PRN
Status: DISCONTINUED | OUTPATIENT
Start: 2024-05-21 | End: 2024-05-22 | Stop reason: HOSPADM

## 2024-05-20 RX ORDER — HYDROXYZINE HYDROCHLORIDE 25 MG/1
50 TABLET, FILM COATED ORAL NIGHTLY PRN
Status: DISCONTINUED | OUTPATIENT
Start: 2024-05-20 | End: 2024-05-22 | Stop reason: HOSPADM

## 2024-05-20 RX ORDER — LIDOCAINE HYDROCHLORIDE 20 MG/ML
INJECTION, SOLUTION INFILTRATION; PERINEURAL
Status: COMPLETED
Start: 2024-05-20 | End: 2024-05-20

## 2024-05-20 RX ORDER — ACETAMINOPHEN 325 MG/1
650 TABLET ORAL EVERY 6 HOURS PRN
Status: DISCONTINUED | OUTPATIENT
Start: 2024-05-20 | End: 2024-05-22 | Stop reason: HOSPADM

## 2024-05-20 RX ORDER — LIDOCAINE HYDROCHLORIDE 20 MG/ML
INJECTION, SOLUTION EPIDURAL; INFILTRATION; INTRACAUDAL; PERINEURAL AS NEEDED
Status: DISCONTINUED | OUTPATIENT
Start: 2024-05-20 | End: 2024-05-20 | Stop reason: SURG

## 2024-05-20 RX ORDER — CITRIC ACID/SODIUM CITRATE 334-500MG
30 SOLUTION, ORAL ORAL ONCE
Status: DISCONTINUED | OUTPATIENT
Start: 2024-05-20 | End: 2024-05-20 | Stop reason: HOSPADM

## 2024-05-20 RX ORDER — PROMETHAZINE HYDROCHLORIDE 25 MG/1
25 TABLET ORAL EVERY 6 HOURS PRN
Status: DISCONTINUED | OUTPATIENT
Start: 2024-05-20 | End: 2024-05-22 | Stop reason: HOSPADM

## 2024-05-20 RX ORDER — TERBUTALINE SULFATE 1 MG/ML
0.25 INJECTION, SOLUTION SUBCUTANEOUS ONCE
Status: COMPLETED | OUTPATIENT
Start: 2024-05-20 | End: 2024-05-20

## 2024-05-20 RX ORDER — MORPHINE SULFATE 2 MG/ML
1 INJECTION, SOLUTION INTRAMUSCULAR; INTRAVENOUS EVERY 4 HOURS PRN
Status: DISCONTINUED | OUTPATIENT
Start: 2024-05-20 | End: 2024-05-21

## 2024-05-20 RX ORDER — OXYTOCIN/0.9 % SODIUM CHLORIDE 30/500 ML
125 PLASTIC BAG, INJECTION (ML) INTRAVENOUS ONCE AS NEEDED
Status: DISCONTINUED | OUTPATIENT
Start: 2024-05-20 | End: 2024-05-22 | Stop reason: HOSPADM

## 2024-05-20 RX ORDER — DOCUSATE SODIUM 100 MG/1
100 CAPSULE, LIQUID FILLED ORAL 2 TIMES DAILY
Status: DISCONTINUED | OUTPATIENT
Start: 2024-05-20 | End: 2024-05-22 | Stop reason: HOSPADM

## 2024-05-20 RX ORDER — HYDROCORTISONE 25 MG/G
1 CREAM TOPICAL AS NEEDED
Status: DISCONTINUED | OUTPATIENT
Start: 2024-05-20 | End: 2024-05-22 | Stop reason: HOSPADM

## 2024-05-20 RX ORDER — SODIUM CHLORIDE 0.9 % (FLUSH) 0.9 %
1-10 SYRINGE (ML) INJECTION AS NEEDED
Status: DISCONTINUED | OUTPATIENT
Start: 2024-05-20 | End: 2024-05-22 | Stop reason: HOSPADM

## 2024-05-20 RX ORDER — OXYTOCIN/0.9 % SODIUM CHLORIDE 30/500 ML
250 PLASTIC BAG, INJECTION (ML) INTRAVENOUS CONTINUOUS
Status: ACTIVE | OUTPATIENT
Start: 2024-05-20 | End: 2024-05-20

## 2024-05-20 RX ORDER — OXYTOCIN/0.9 % SODIUM CHLORIDE 30/500 ML
999 PLASTIC BAG, INJECTION (ML) INTRAVENOUS ONCE
Status: DISCONTINUED | OUTPATIENT
Start: 2024-05-20 | End: 2024-05-20 | Stop reason: HOSPADM

## 2024-05-20 RX ORDER — OXYTOCIN/0.9 % SODIUM CHLORIDE 30/500 ML
2-20 PLASTIC BAG, INJECTION (ML) INTRAVENOUS
Status: DISCONTINUED | OUTPATIENT
Start: 2024-05-20 | End: 2024-05-22 | Stop reason: HOSPADM

## 2024-05-20 RX ORDER — ONDANSETRON 4 MG/1
4 TABLET, ORALLY DISINTEGRATING ORAL EVERY 8 HOURS PRN
Status: DISCONTINUED | OUTPATIENT
Start: 2024-05-20 | End: 2024-05-22 | Stop reason: HOSPADM

## 2024-05-20 RX ORDER — ONDANSETRON 2 MG/ML
4 INJECTION INTRAMUSCULAR; INTRAVENOUS EVERY 6 HOURS PRN
Status: DISCONTINUED | OUTPATIENT
Start: 2024-05-20 | End: 2024-05-22 | Stop reason: HOSPADM

## 2024-05-20 RX ORDER — IBUPROFEN 600 MG/1
600 TABLET ORAL EVERY 6 HOURS PRN
Status: DISCONTINUED | OUTPATIENT
Start: 2024-05-20 | End: 2024-05-22 | Stop reason: HOSPADM

## 2024-05-20 RX ORDER — NALOXONE HCL 0.4 MG/ML
0.4 VIAL (ML) INJECTION
Status: DISCONTINUED | OUTPATIENT
Start: 2024-05-20 | End: 2024-05-22 | Stop reason: HOSPADM

## 2024-05-20 RX ORDER — LIDOCAINE HYDROCHLORIDE 20 MG/ML
20 INJECTION, SOLUTION EPIDURAL; INFILTRATION; INTRACAUDAL; PERINEURAL ONCE
Status: DISCONTINUED | OUTPATIENT
Start: 2024-05-20 | End: 2024-05-22 | Stop reason: HOSPADM

## 2024-05-20 RX ORDER — PRENATAL VIT/IRON FUM/FOLIC AC 27MG-0.8MG
1 TABLET ORAL DAILY
Status: DISCONTINUED | OUTPATIENT
Start: 2024-05-20 | End: 2024-05-22 | Stop reason: HOSPADM

## 2024-05-20 RX ORDER — EPHEDRINE SULFATE 50 MG/ML
10 INJECTION, SOLUTION INTRAVENOUS
Status: DISCONTINUED | OUTPATIENT
Start: 2024-05-20 | End: 2024-05-20 | Stop reason: HOSPADM

## 2024-05-20 RX ORDER — MORPHINE SULFATE 10 MG/ML
10 INJECTION INTRAMUSCULAR; INTRAVENOUS; SUBCUTANEOUS ONCE
Status: COMPLETED | OUTPATIENT
Start: 2024-05-20 | End: 2024-05-20

## 2024-05-20 RX ORDER — TRAMADOL HYDROCHLORIDE 50 MG/1
50 TABLET ORAL EVERY 6 HOURS PRN
Status: DISCONTINUED | OUTPATIENT
Start: 2024-05-20 | End: 2024-05-22 | Stop reason: HOSPADM

## 2024-05-20 RX ORDER — LIDOCAINE HYDROCHLORIDE AND EPINEPHRINE 15; 5 MG/ML; UG/ML
INJECTION, SOLUTION EPIDURAL AS NEEDED
Status: DISCONTINUED | OUTPATIENT
Start: 2024-05-20 | End: 2024-05-20 | Stop reason: SURG

## 2024-05-20 RX ORDER — CALCIUM CARBONATE 500 MG/1
2 TABLET, CHEWABLE ORAL 3 TIMES DAILY PRN
Status: DISCONTINUED | OUTPATIENT
Start: 2024-05-20 | End: 2024-05-22 | Stop reason: HOSPADM

## 2024-05-20 RX ORDER — METHYLERGONOVINE MALEATE 0.2 MG/ML
200 INJECTION INTRAVENOUS ONCE AS NEEDED
Status: DISCONTINUED | OUTPATIENT
Start: 2024-05-20 | End: 2024-05-22 | Stop reason: HOSPADM

## 2024-05-20 RX ORDER — PROMETHAZINE HYDROCHLORIDE 12.5 MG/1
12.5 SUPPOSITORY RECTAL EVERY 6 HOURS PRN
Status: DISCONTINUED | OUTPATIENT
Start: 2024-05-20 | End: 2024-05-22 | Stop reason: HOSPADM

## 2024-05-20 RX ORDER — MISOPROSTOL 200 UG/1
600 TABLET ORAL ONCE AS NEEDED
Status: DISCONTINUED | OUTPATIENT
Start: 2024-05-20 | End: 2024-05-22 | Stop reason: HOSPADM

## 2024-05-20 RX ORDER — LIDOCAINE HYDROCHLORIDE 20 MG/ML
20 INJECTION, SOLUTION INFILTRATION; PERINEURAL ONCE AS NEEDED
Status: COMPLETED | OUTPATIENT
Start: 2024-05-20 | End: 2024-05-20

## 2024-05-20 RX ORDER — FAMOTIDINE 10 MG/ML
20 INJECTION, SOLUTION INTRAVENOUS ONCE AS NEEDED
OUTPATIENT
Start: 2024-05-20

## 2024-05-20 RX ORDER — TERBUTALINE SULFATE 1 MG/ML
INJECTION, SOLUTION SUBCUTANEOUS
Status: COMPLETED
Start: 2024-05-20 | End: 2024-05-20

## 2024-05-20 RX ADMIN — MORPHINE SULFATE 10 MG: 10 INJECTION INTRAVENOUS at 00:30

## 2024-05-20 RX ADMIN — TERBUTALINE SULFATE 0.25 MG: 1 INJECTION, SOLUTION SUBCUTANEOUS at 04:52

## 2024-05-20 RX ADMIN — BUTORPHANOL TARTRATE 2 MG: 2 INJECTION, SOLUTION INTRAMUSCULAR; INTRAVENOUS at 07:52

## 2024-05-20 RX ADMIN — VENLAFAXINE HYDROCHLORIDE 225 MG: 75 CAPSULE, EXTENDED RELEASE ORAL at 20:48

## 2024-05-20 RX ADMIN — ROPIVACAINE HYDROCHLORIDE 8 ML/HR: 2 INJECTION, SOLUTION EPIDURAL; INFILTRATION at 02:25

## 2024-05-20 RX ADMIN — LIDOCAINE HYDROCHLORIDE 20 ML: 20 INJECTION, SOLUTION INFILTRATION; PERINEURAL at 07:45

## 2024-05-20 RX ADMIN — DOCUSATE SODIUM 100 MG: 100 CAPSULE, LIQUID FILLED ORAL at 14:08

## 2024-05-20 RX ADMIN — SODIUM CHLORIDE, POTASSIUM CHLORIDE, SODIUM LACTATE AND CALCIUM CHLORIDE 125 ML/HR: 600; 310; 30; 20 INJECTION, SOLUTION INTRAVENOUS at 05:09

## 2024-05-20 RX ADMIN — DOCUSATE SODIUM 100 MG: 100 CAPSULE, LIQUID FILLED ORAL at 20:48

## 2024-05-20 RX ADMIN — Medication 2 MILLI-UNITS/MIN: at 06:30

## 2024-05-20 RX ADMIN — Medication 10 ML: at 22:20

## 2024-05-20 RX ADMIN — LIDOCAINE HYDROCHLORIDE 5 ML: 20 INJECTION, SOLUTION EPIDURAL; INFILTRATION; INTRACAUDAL; PERINEURAL at 02:20

## 2024-05-20 RX ADMIN — SODIUM CHLORIDE, POTASSIUM CHLORIDE, SODIUM LACTATE AND CALCIUM CHLORIDE 125 ML/HR: 600; 310; 30; 20 INJECTION, SOLUTION INTRAVENOUS at 07:12

## 2024-05-20 RX ADMIN — IBUPROFEN 600 MG: 600 TABLET, FILM COATED ORAL at 22:19

## 2024-05-20 RX ADMIN — LIDOCAINE HYDROCHLORIDE 5 ML: 20 INJECTION, SOLUTION EPIDURAL; INFILTRATION; INTRACAUDAL; PERINEURAL at 04:56

## 2024-05-20 RX ADMIN — NIFEDIPINE 60 MG: 60 TABLET, FILM COATED, EXTENDED RELEASE ORAL at 20:48

## 2024-05-20 RX ADMIN — Medication: at 14:08

## 2024-05-20 RX ADMIN — PRENATAL VIT W/ FE FUMARATE-FA TAB 27-0.8 MG 1 TABLET: 27-0.8 TAB at 14:08

## 2024-05-20 RX ADMIN — LIDOCAINE HYDROCHLORIDE AND EPINEPHRINE 3 ML: 15; 5 INJECTION, SOLUTION EPIDURAL at 02:16

## 2024-05-20 NOTE — ANESTHESIA PREPROCEDURE EVALUATION
Anesthesia Evaluation                  Airway   Mallampati: II  TM distance: >3 FB  No difficulty expected  Dental - normal exam     Pulmonary    (+) a smoker Former,  Cardiovascular     (+) hypertension      Neuro/Psych  (+) psychiatric history Anxiety  GI/Hepatic/Renal/Endo    (+) diabetes mellitus gestational    Musculoskeletal     Abdominal    Substance History      OB/GYN    (+) Pregnant, pregnancy induced hypertension        Other                    Anesthesia Plan    ASA 2     epidural       Anesthetic plan, risks, benefits, and alternatives have been provided, discussed and informed consent has been obtained with: patient.    CODE STATUS:    Code Status (Patient has no pulse and is not breathing): CPR (Attempt to Resuscitate)  Medical Interventions (Patient has pulse or is breathing): Full Support

## 2024-05-20 NOTE — ANESTHESIA POSTPROCEDURE EVALUATION
Patient: Vanessa Menchaca    Procedure Summary       Date: 05/20/24 Room / Location:     Anesthesia Start: 0159 Anesthesia Stop: 0738    Procedure: LABOR ANALGESIA Diagnosis:     Scheduled Providers:  Provider: Ilia Landeros CRNA    Anesthesia Type: epidural ASA Status: 2            Anesthesia Type: epidural    Vitals  Vitals Value Taken Time   /84 05/20/24 1130   Temp 98.1 °F (36.7 °C) 05/20/24 1130   Pulse 108 05/20/24 1130   Resp 20 05/20/24 1130   SpO2 97 % 05/20/24 1130           Post Anesthesia Care and Evaluation    Patient location during evaluation: bedside  Patient participation: complete - patient participated  Level of consciousness: awake and alert  Pain management: adequate    Airway patency: patent  Anesthetic complications: No anesthetic complications  PONV Status: none  Cardiovascular status: acceptable  Respiratory status: acceptable  Hydration status: acceptable  Post Neuraxial Block status: Motor and sensory function returned to baseline and No signs or symptoms of PDPHNo anesthesia care post op

## 2024-05-20 NOTE — PLAN OF CARE
Goal Outcome Evaluation:  Plan of Care Reviewed With: patient   VSS, fundus firm, midline U1, lochia scant, voiding without difficulty, denies any c/o pain, reflex 2+, no c/o h/a, visual changes or epigastric discomfort.      Progress: improving

## 2024-05-20 NOTE — L&D DELIVERY NOTE
[facility]  Vaginal Delivery Note  Review the Delivery Report for details.       Delivery     Delivery: Vaginal, Spontaneous     YOB: 2025    Time of Birth:  Gestational Age 7:38 AM   37w5d     Anesthesia: Epidural     Delivering clinician: Sheramn Jensen    Forceps?   No   Vacuum? No    Shoulder dystocia present: No        Delivery narrative:  The patient was noted to be completely dilated and effaced with the fetal head at the introitus. The fetal vertex was delivered OA spontaneously with maternal pushing efforts. Terminal bradycardia noted, however pt pushing well. yes; Number of nuchal loops present:  1 , tight therefore clamped and cut. The right  anterior shoulder was delivered atraumatically by maternal expulsive efforts with the assistance of routine downward traction. The posterior shoulder delivered with maternal expulsive efforts and routine upward traction. The remainder of the fetus delivered spontaneously. Upon delivery, the infant was noted to be depressed and was passed to nursery team. Cord blood was obtained for analysis and blood gas analysis. During the third stage of labor, IV pitocin was administered to enhance uterine contraction. The placenta required manual extraction. Noted to be intact, with a three vessel cord.The cervix, vagina and perineum were inspected for lacerations. A second degree laceration was appreciated and repaired with #3-0 caprosyn to reapproximate the laceration in layers. Anesthesia during laceration repair: epidural and lidocaine. Good hemostasis was confirmed. No lacerations were noted to the cervix, perineum, or vulva. The uterine fundus was firm at the end of the procedure.  Mom and baby tolerated the delivery well. All needle, sponge, and instrument counts were noted to be correct x2 at the end of the procedure.       Infant    Findings: male  infant     Infant observations: Weight: 3210 g (7 lb 1.2 oz)   Length:   in  Observations/Comments:       "  Apgars: 3  @ 1 minute /    6  @ 5 minutes   Infant Name:      Placenta, Cord, and Fluid    Placenta delivered  Manual removal  at   5/20/2024  7:55 AM     Cord: 3 vessels  present.   Nuchal Cord?  yes; Number of nuchal loops present:  1    Cord blood obtained: Yes    Cord gases obtained:  Yes    Cord gas results: Venous:  No results found for: \"PHCVEN\", \"BECVEN\"    Arterial:  No results found for: \"PHCART\", \"BECART\"     Repair    Episiotomy: None     No    Lacerations: Yes  Laceration Information  Laceration Repaired?   Perineal: 2nd  Yes    Periurethral:       Labial:       Sulcus:       Vaginal:       Cervical:         Suture used for repair: 3-0 caprosyn     Estimated Blood Loss:  200 cc             Quantitative Blood Loss:  200 cc                Complications  none    Disposition  Mother to Mother Baby/Postpartum  in stable condition currently.  Baby to NICU  in stable condition currently.      Sallie Hassan MD  05/20/24  08:06 CDT       "

## 2024-05-20 NOTE — LACTATION NOTE
Visit with patient who is formula feeding. She does not desire/plan to breastfeed. Suppression education provided. Recommended patient don bra as soon as she is able. Understanding verbalized. Questions denied.     Suppression of Lactation for Non-Nursing Mothers handout    If you choose not to breastfeed, please let us know if you have any questions about whether yours was the right choice for you and your family.  While there are a very few conditions where breastfeeding is not recommended, most uses surrounding breastfeeding can be managed with proper support.  We are here to hep and support you no matter how you choose to feed your baby.    To suppress further lactation and prevent milk from coming in-- as much as possible:  **Wear a good fitting support bra without an under wire (sports bras are good)   **Wear bra continuously day and night for about 1-2 weeks  **Avoid any kind of breast stimulation such as rubbing, warmth or massage.  ** While showering, try to stand with your back to the water. The warm water will     encourage milk flow.  **Cold compression may be applied for 20 minutes once per hour as needed.  **Anti-Inflammatory medications such as ibuprofen (Motrin) may help.  Ue per your doctors and/or manufacture instructions.  ** If you develop a fever greater than 101 F, especially if you also have flu- like symptoms and any areas of redness or swelling in your breasts, please call your physician. You may need treatment for a condition called mastitis.      The Many Benefits if Breastfeeding   Breastfeeding saves time  *Breastfeeding allows you to calm or feed your baby immediately, which leads to a happier baby who cries less  *There is nothing to buy, prepare, or maintain.There is nothing to clean or sterilize.  Breastfeeding builds a mothers confidence  *She knows all her baby needs to thrive is her!  Breastfeeding saves Money  *There is no formula to buy and healthier breast fed babies have less  medical costs  Healthy Mom/Healthy baby  * babies get sick less often, and when they do they are usually sick less severely and for a shorter time  * babies have fewer ear infections  * babies have fewer allergies  *Mothers who breastfeed have a lower risk for cancer, osteoporosis, anemia, high blood pressure, obesity, and Type ll diabetes  *Mothers miss less work days with sick babies  Breast fed babies have a better dental health  * babies have better jaw development which requires lest orthodontic work  *Breast milk does not promote cavities  * babies can nurse at night without worry of tooth decay  Breastfeeding allows a baby to reach his full IQ potential  *The longer a baby is breast fed, the better their brain development  Breast fed babies and moms are more relaxed  *The hormones released during breastfeeding have a calming effect on mothers  *Breastfeeding requires mom to take a break; this may help mom get more rest after delivery  *Breastfeeding is quicker than preparing formula which allows mom and baby to get back to sleep faster  *Breastfeeding promotes bonding and allows mom to learn babies cues and care needs more quickly  Breastfeeding cleanup is easier  *The bowel movements and spit up of breast fed babies doesn't smell as bad  *Spit-up of breast fed babies doesn't stain clothing  Getting out of the hourse is easier  *No formula bottles to prepare and carry safely   *No time restraints due to worry about what baby will eat  *No worries about warming a bottle or finding safe water to prepare bottles  Breastfeeding mother get their bodies back sooner  *The uterus shrinks more quickly and completely, which allows a flatter tummy  *Breastfeeding burns 400-500 calories a day; making milk torches stored fat!  Breastfeeding is better for the environment  *There is no trash to dispose of after breastfeeding  *There is no production facility to produce breast  milk; moms body does it all without the pollution of a factory    Your Guide to formula feeding your baby by [employer], www.[employer]

## 2024-05-20 NOTE — PLAN OF CARE
Goal Outcome Evaluation:  Plan of Care Reviewed With: patient        Progress: no change  Outcome Evaluation: Pt is a  PIH and GDM IOL. Pt has been on Oxytocin throughout the night. LR @125; Oxytocin was turned off @ 0430; Pt was given Terbutaline to stop contractions per MD order. Pt has an epidural in place; Pt is 10/100%/-3.

## 2024-05-20 NOTE — PAYOR COMM NOTE
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 external monitors used       Result Review    Latest Reference Range & Units 24 05:15 24 10:35 24 14:27 24 19:25 24 05:55   Glucose 70 - 130 mg/dL 108 88 135 (H) 107 105   (H): Data is abnormally high        Assessment   IUP at 37w4d  Preeclampsia without severe features  Gestational diabetes, diet controlled  Late prenatal care  GBS negative    Plan   -Continue induction  -S/p cytotec x6, s/p cervidil x1 (cervidil out earlier than full 12 hours - was not present at time for removal, patient does not remember seeing it come out overnight; not present/retained on exam this morning)  -Pitocin per protocol  -NPO now as on pitocin protocol  -AROM PRN  -Possible cook catheter pending cervix  -epidural at patient request  -Monitor for severe symptoms of preeclampsia   -SCDs while in bed  -Ambulation encouraged  -Allow labor to continue pending maternal and fetal " status  -Plan discussed with family and questions answered.  Understanding verbalized.    Sherman Jensen MD  2024  07:34 CDT             Electronically signed by Sherman Jensen MD at 24 0740       Sherman Jensen MD at 24 0702              Sherman Jensen MD  [facility]  [address]  [place] [address]  Office [phone]  Fax [phone]      [facility]  Vanessa Rosedale  : 1995  MRN: [id number]  CSN: [id number]    Labor progress note    Subjective   She reports is having no problems. Denies preeclampsia symptoms including headache, vision changes or RUQ pain. Endorses appropriate fetal movement. Denies contractions, leakage of fluid or vaginal bleeding.     Objective   Min/max vitals past 24 hours:  Temp  Min: 97.7 °F (36.5 °C)  Max: 98.1 °F (36.7 °C)   BP  Min: 120/89  Max: 144/98   Pulse  Min: 79  Max: 110   Resp  Min: 15  Max: 18        General: Well Developed, Well Nourished, not in acute distress   HEENT: normocephalic, atraumatic, conjunctiva non-icteric    Respiratory: non-labored, symmetrical chest rise   Gastrointestinal: gravid, soft, no TTP, no rebound tenderness or guarding to palpation, no palpable ctx   Neurologic: alert and oriented, CN II-XII grossly intact without focal deficit, DTRs 2+ lower extremities, no clonus   Psychiatric: normal mood, pleasant, cooperative  Musculoskeletal: negative calf tenderness, trace lower extremity edema  Skin: warm & dry, no cyanosis, no jaundice    Pelvic Exam:  deferred        FHT's: reactive, reassuring and category 1.  external monitors used   Cervix: was not checked.   Contractions: Irritability on toco       Result Review   Preelampsia Labs:    Results from last 7 days   Lab Units 24  1144 24  1143   WBC 10*3/mm3  --  10.98*   HEMOGLOBIN g/dL  --  14.2   HEMATOCRIT %  --  41.9   PLATELETS 10*3/mm3  --  291   POTASSIUM mmol/L  --  4.0   ALT (SGPT) U/L  --  45*   AST (SGOT) U/L  --  27   CREATININE mg/dL   --  0.50*   BILIRUBIN mg/dL  --  0.2   LDH U/L  --  179   URIC ACID mg/dL  --  6.3*   PROT/CREAT RATIO UR mg/G Crea 203.3*  --       Latest Reference Range & Units 24 11:43 24 13:23 24 17:12 24 21:08 24 05:15   Glucose 70 - 130 mg/dL 96 108 126 98 108   (H): Data is abnormally high            Assessment   IUP at 37w3d  Preeclampsia without severe features  Gestational diabetes, diet controlled  Late prenatal care  GBS negative    Plan   Continue induction  S/p cytotec x4, continue cytotec x 2 more doses then reassess cervix  If cervix unchanged, trial of cervidil   Regular diet with glucose checks (fasting and 1-hour postprandial)  Monitor for severe symptoms of preeclampsia   SCDs while in bed  Ambulation encouraged, TID  Allow labor to continue pending maternal and fetal status  Plan discussed with family and questions answered.  Understanding verbalized.    Sherman Jensen MD  2024  07:02 CDT             Electronically signed by Sherman Jensen MD at 24 0708       Sherman Jensen MD at 24 1309              Sherman Jensen MD  [facility]  [address] [place] [address]  Office [phone]  Fax [phone]      [facility]  [facility] 1995  MRN: [id number]  CSN: [id number]    Labor progress note    Subjective   She reports is having no problems. Denies preeclampsia symptoms currently. Denies contractions, leakage of fluid or vaginal bleeding.     Objective   Min/max vitals past 24 hours:  No data recorded   BP  Min: 128/85  Max: 144/98   Pulse  Min: 89  Max: 98   No data recorded        FHT's: reactive, reassuring and category 1.  external monitors used   Cervix: was not checked.   Contractions: Uterine irritability       Result Review   Preelampsia Labs:    Results from last 7 days   Lab Units 24  1143 05/15/24  0841 24  1521   WBC 10*3/mm3 10.98*   < >  --    HEMOGLOBIN g/dL 14.2   < >  --    HEMATOCRIT % 41.9   < >  --     PLATELETS 10*3/mm3 291   < >  --    POTASSIUM mmol/L 4.0   < >  --    ALT (SGPT) U/L 45*   < >  --    AST (SGOT) U/L 27   < >  --    CREATININE mg/dL 0.50*   < >  --    BILIRUBIN mg/dL 0.2   < >  --    LDH U/L 179   < >  --    URIC ACID mg/dL 6.3*   < >  --    PROT/CREAT RATIO UR mg/G Crea  --   --  234.7*    < > = values in this interval not displayed.             Assessment   IUP at 37w2d  Gestational hypertension  Gestational diabetes, diet controlled  Late prenatal care  GBS negative    Plan   Cervical ripening - oral cytotec 25 mcg every 4 hours  Pitocin per protocol/AROM as needed   Epidural at patient request  Continue home medications  Ok for diet  Glucose checks: fasting and 1 hour after meals  Monitor for severe symptoms of preeclampsia  Allow labor to continue pending maternal and fetal status  Plan discussed with family and questions answered.  Understanding verbalized.    Sherman Jensen MD  5/17/2024  13:09 CDT             Electronically signed by Sherman Jensen MD at 05/17/24 8975

## 2024-05-20 NOTE — NURSING NOTE
Pt off monitors from [phone] for epidural placement; RN at bedside attempting to get pt back on monitors.

## 2024-05-20 NOTE — PLAN OF CARE
Goal Outcome Evaluation:         MD CHARITO,U1 scant bleeding no clots, 2 bags of pitocin, pt denies pain, ambulated to bathroom

## 2024-05-20 NOTE — ANESTHESIA PROCEDURE NOTES
Labor Epidural      Patient reassessed immediately prior to procedure    Start Time: 5/20/2024 2:06 AM  Stop Time: 5/20/2024 2:13 AM  Indication:at surgeon's request  Performed By  CRNA/CAA: Ilia Landeros CRNA  Preanesthetic Checklist  Completed: patient identified, IV checked, site marked, risks and benefits discussed, surgical consent, monitors and equipment checked, pre-op evaluation and timeout performed  Prep:  Pt Position:sitting  Sterile Tech:gloves, cap and sterile barrier  Prep:chlorhexidine gluconate and isopropyl alcohol  Monitoring:continuous pulse oximetry, EKG and blood pressure monitoring  Epidural Block Procedure:  Approach:midline  Location:L4-L5  Needle Type:Tuohy  Needle Gauge:17 G  Loss of Resistance Medium: saline  Loss of Resistance: 12cm  Cath Depth at skin:15 cm  Paresthesia: none  Aspiration:negative  Test Dose:negative  Number of Attempts: 1  Post Assessment:  Dressing:secured with tape and occlusive dressing applied  Pt Tolerance:patient tolerated the procedure well with no apparent complications  Complications:no

## 2024-05-21 ENCOUNTER — PATIENT OUTREACH (OUTPATIENT)
Dept: LABOR AND DELIVERY | Facility: HOSPITAL | Age: 29
End: 2024-05-21
Payer: COMMERCIAL

## 2024-05-21 LAB
HCT VFR BLD AUTO: 38.9 % (ref 34–46.6)
HGB BLD-MCNC: 12.2 G/DL (ref 12–15.9)

## 2024-05-21 PROCEDURE — 85014 HEMATOCRIT: CPT | Performed by: OBSTETRICS & GYNECOLOGY

## 2024-05-21 PROCEDURE — 85018 HEMOGLOBIN: CPT | Performed by: OBSTETRICS & GYNECOLOGY

## 2024-05-21 RX ADMIN — Medication 10 ML: at 20:29

## 2024-05-21 RX ADMIN — IBUPROFEN 600 MG: 600 TABLET, FILM COATED ORAL at 09:47

## 2024-05-21 RX ADMIN — DOCUSATE SODIUM 100 MG: 100 CAPSULE, LIQUID FILLED ORAL at 09:43

## 2024-05-21 RX ADMIN — PRENATAL VIT W/ FE FUMARATE-FA TAB 27-0.8 MG 1 TABLET: 27-0.8 TAB at 09:43

## 2024-05-21 RX ADMIN — NIFEDIPINE 60 MG: 60 TABLET, FILM COATED, EXTENDED RELEASE ORAL at 20:28

## 2024-05-21 RX ADMIN — VENLAFAXINE HYDROCHLORIDE 225 MG: 75 CAPSULE, EXTENDED RELEASE ORAL at 20:33

## 2024-05-21 RX ADMIN — DOCUSATE SODIUM 100 MG: 100 CAPSULE, LIQUID FILLED ORAL at 20:28

## 2024-05-21 RX ADMIN — Medication: at 20:33

## 2024-05-21 NOTE — PLAN OF CARE
Goal Outcome Evaluation:              Outcome Evaluation: VSS, FF, ML, scant lochia, medicated x 1 today for headache, IID

## 2024-05-21 NOTE — PLAN OF CARE
Goal Outcome Evaluation:  Plan of Care Reviewed With: patient        Progress: improving  Outcome Evaluation: VSS, FFML U1, scant rubra, voiding, ambulating, pain at tolerable level, ibuprofen x1 this shift

## 2024-05-21 NOTE — OUTREACH NOTE
[facility]  IP Postpartum    Questions/Answers      Flowsheet Row Responses   Best Method for Contacting Cell   Support Person Present Yes   Does the patient have a car seat at the hospital Yes   Delivery Note Reviewed Reviewed   Were birth expectations met? Yes   Is there a need for additional support/resources? No   Lactation Note Reviewed Reviewed   Is additional support needed? No   Any questions or concerns? No   Is the patient going to use Meds to Beds? Yes   Any concerns related discharge meds/ability to  prescriptions? No   Confirm Postpartum OB appointment Yes   Confirm initial well-child Pediatrician appointment date/time: Yes   Does patient have transportation to appointments? Yes   Does patient have supplies needed at home for  care? Clothing, Crib, Diapers, Formula          At Vanessa's bedside. Family in room. Infant in room. Vanessa states she has all necessary items for herself and baby at home.  Postpartum check-in call scheduled and reminder given.  Reminder also given related to calling WIC office in [facility] and adding baby to feeding plan.     Anjali Ramirez RN  Maternity Nurse Navigator    2024, 14:45 CDT

## 2024-05-21 NOTE — PROGRESS NOTES
" [facility]  Vaginal Delivery Progress Note    Subjective   Postpartum Day 1: Vaginal Delivery    The patient feels well.  Her pain is well controlled with ibuprofen (OTC).   She is ambulating well.  Patient describes her bleeding as moderate lochia.    Breastfeeding: declines.    Objective     Vital Signs Range for the last 24 hours  Temperature: Temp:  [97.1 °F (36.2 °C)-98.2 °F (36.8 °C)] 97.9 °F (36.6 °C)   Temp Source: Temp src: Temporal   BP: BP: (100-147)/(48-94) 122/77   Pulse: Heart Rate:  [] 86   Respirations: Resp:  [16-20] 16   SPO2: SpO2:  [96 %-98 %] 96 %   O2 Amount (l/min):     O2 Devices Device (Oxygen Therapy): room air   Weight:       Admit Height:         Physical Exam:  General:  no acute distresss.  Lungs:  breathing is unlabored  Abdomen: Fundus: appropriate, firm, non tender  Extremities: normal, atraumatic, no cyanosis, and 1+ edema.       Lab results reviewed:  Yes   Rubella:  No results found for: \"RUBELLAIGGIN\" Nurse Transcribed from prenatal record --  No components found for: \"EXTRUBELQUAL\"  Rh Status:  No results found for: \"RH\"  Immunizations:   Immunization History   Administered Date(s) Administered    31-influenza Vac Quardvalent Preservativ 12/30/2020    DTP / HiB 03/06/1996    DTaP, Unspecified 01/07/1999    MMR 03/06/1996, 01/07/1999    OPV 01/07/1999    Tdap 06/27/2006, 03/25/2024    Varicella 01/07/1999     Lab Results (last 24 hours)       Procedure Component Value Units Date/Time    Hemoglobin & Hematocrit, Blood [[id number]]  (Normal) Collected: 05/21/24 0537    Specimen: Blood Updated: 05/21/24 0637     Hemoglobin 12.2 g/dL      Hematocrit 38.9 %     Tissue Pathology Exam [[id number]] Collected: 05/20/24 0840    Specimen: Tissue from Placenta Updated: 05/20/24 6137            Assessment & Plan       Gestational hypertension w/o significant proteinuria in 3rd trimester    Hypertension affecting pregnancy in third trimester    37 weeks gestation of pregnancy      Vanessa " Menchaca is Day 1  post-partum  Vaginal, Spontaneous   .      Plan:  Continue current care and plan for discharge tomorrow.      Josette Joiner MD  5/21/2024  08:28 CDT

## 2024-05-22 VITALS
DIASTOLIC BLOOD PRESSURE: 97 MMHG | SYSTOLIC BLOOD PRESSURE: 143 MMHG | OXYGEN SATURATION: 97 % | HEART RATE: 87 BPM | TEMPERATURE: 97.5 F | RESPIRATION RATE: 18 BRPM

## 2024-05-22 PROBLEM — Z3A.37 37 WEEKS GESTATION OF PREGNANCY: Status: RESOLVED | Noted: 2024-05-17 | Resolved: 2024-05-22

## 2024-05-22 PROBLEM — O13.9 GESTATIONAL HYPERTENSION, ANTEPARTUM: Status: RESOLVED | Noted: 2024-03-25 | Resolved: 2024-05-22

## 2024-05-22 PROBLEM — O13.3 GESTATIONAL HYPERTENSION W/O SIGNIFICANT PROTEINURIA IN 3RD TRIMESTER: Status: RESOLVED | Noted: 2024-05-17 | Resolved: 2024-05-22

## 2024-05-22 PROBLEM — O13.3 GESTATIONAL HYPERTENSION, THIRD TRIMESTER: Status: ACTIVE | Noted: 2024-05-22

## 2024-05-22 PROBLEM — O16.3 HYPERTENSION AFFECTING PREGNANCY IN THIRD TRIMESTER: Status: RESOLVED | Noted: 2024-05-17 | Resolved: 2024-05-22

## 2024-05-22 LAB
LAB AP CASE REPORT: NORMAL
LAB AP DIAGNOSIS COMMENT: NORMAL
Lab: NORMAL
PATH REPORT.FINAL DX SPEC: NORMAL
PATH REPORT.GROSS SPEC: NORMAL

## 2024-05-22 RX ADMIN — Medication: at 09:12

## 2024-05-22 RX ADMIN — IBUPROFEN 600 MG: 600 TABLET, FILM COATED ORAL at 00:12

## 2024-05-22 RX ADMIN — DOCUSATE SODIUM 100 MG: 100 CAPSULE, LIQUID FILLED ORAL at 08:37

## 2024-05-22 RX ADMIN — PRENATAL VIT W/ FE FUMARATE-FA TAB 27-0.8 MG 1 TABLET: 27-0.8 TAB at 08:37

## 2024-05-22 NOTE — PLAN OF CARE
Goal Outcome Evaluation:  Plan of Care Reviewed With: patient        Progress: improving  Outcome Evaluation: Cont with POC: VSS, no c/o HA or blurred vision, no clonus, no epigastric pain. C/O of min abd cramping, ptn motrin given. Ambulating in room, using comfort products, voiding with out any issues, no BM since before delivery. FFMLU1 with scant rubra. Formula feeding. Bonding well with .

## 2024-05-22 NOTE — PROGRESS NOTES
"[facility]  Vaginal Delivery Progress Note    Subjective   Postpartum Day 2: Vaginal Delivery    The patient feels well.  Her pain is well controlled with ibuprofen (OTC).   She is ambulating well.  Patient describes her bleeding as moderate lochia, lighter than yesterday    Breastfeeding: declines.    Objective     Vital Signs Range for the last 24 hours  Temperature: Temp:  [97.5 °F (36.4 °C)-98.4 °F (36.9 °C)] 97.5 °F (36.4 °C)   Temp Source: Temp src: Oral   BP: BP: (109-136)/(65-90) 126/75   Pulse: Heart Rate:  [82-98] 82   Respirations: Resp:  [16-18] 18   SPO2: SpO2:  [96 %-98 %] 96 %   O2 Amount (l/min):     O2 Devices Device (Oxygen Therapy): room air   Weight:       Admit Height:         Physical Exam:  General:  no acute distresss.  Lungs:  breathing is unlabored  Abdomen: Fundus: appropriate, firm, non tender  Extremities: normal, atraumatic, no cyanosis, and trace edema.       Lab results reviewed:  Yes   Rubella:  No results found for: \"RUBELLAIGGIN\" Nurse Transcribed from prenatal record --  No components found for: \"EXTRUBELQUAL\"  Rh Status:  No results found for: \"RH\"  Immunizations:   Immunization History   Administered Date(s) Administered    31-influenza Vac Quardvalent Preservativ 12/30/2020    DTP / HiB 03/06/1996    DTaP, Unspecified 01/07/1999    MMR 03/06/1996, 01/07/1999    OPV 01/07/1999    Tdap 06/27/2006, 03/25/2024    Varicella 01/07/1999     Lab Results (last 24 hours)       ** No results found for the last 24 hours. **            Assessment & Plan       Gestational hypertension w/o significant proteinuria in 3rd trimester    Hypertension affecting pregnancy in third trimester    37 weeks gestation of pregnancy      Vanessa Menchaca is Day 2 post-partum  Vaginal, Spontaneous   .      Plan:  Discharge today  RTO in 2 weeks for BP check      Josette Joiner MD  5/22/2024  06:46 CDT  "

## 2024-05-22 NOTE — DISCHARGE SUMMARY
Discharge Summary     Ifrah Menchaca  : 1995  MRN: [id number]  CSN: [id number]    Date of Admission: 2024   Date of Discharge:  2024   Delivering Physician: Sallie Hassan        Admission Diagnosis: Pregnancy [Z34.90]  Hypertension affecting pregnancy in third trimester [O16.3]  Gestational hypertension w/o significant proteinuria in 3rd trimester [O13.3]   Discharge Diagnosis: Pregnancy at 37w5d - delivered       Procedures: 2024  - Vaginal, Spontaneous       Hospital Course  Patient is a 28 y.o.  who at 37w5d had a vaginal birth.  Her postpartum course was without complications.  On PPD #2 she was ready for discharge.  She had normal lochia and pain well controlled with oral medications.  BP well-controlled    Infant  male  fetus weighing 3210 g (7 lb 1.2 oz)   Apgars -  3 @ 1 minute /  6 @ 5 minutes.    Discharge labs  Lab Results   Component Value Date    WBC 10.98 (H) 2024    HGB 12.2 2024    HCT 38.9 2024     2024       Discharge Medications     Discharge Medications        Continue These Medications        Instructions Start Date   glucose monitor monitoring kit   Check BS fasting and 2 hours postprandial      Lancets misc   Use as directed to check blood sugar four times a day, fasting and 2 hours postprandial      NIFEdipine XL 60 MG 24 hr tablet  Commonly known as: PROCARDIA XL   60 mg, Oral, Daily      Prenatal 19 chewable tablet       promethazine 25 MG tablet  Commonly known as: PHENERGAN       venlafaxine  MG 24 hr capsule  Commonly known as: EFFEXOR-XR   No dose, route, or frequency recorded.      venlafaxine XR 75 MG 24 hr capsule  Commonly known as: EFFEXOR-XR   No dose, route, or frequency recorded.             Stop These Medications      glucose blood test strip              Discharge Disposition Home or Self Care   Condition on Discharge: good   Follow-up: 2 weeks with Sallie Hassan.  Short-interval follow-up  to check BP     Josette Joiner MD  5/22/2024

## 2024-05-22 NOTE — PROGRESS NOTES
"      Peter Dean MD  [facility]  [address]  [place] [address]  Office [phone]  Fax [phone]    [facility]  Vaginal Delivery Progress Note    Subjective   Postpartum Day 2: Vaginal Delivery    The patient feels well.  Her pain is well controlled with nonsteroidal anti-inflammatory drugs and Tylenol.   She is ambulating well.  Patient describes her bleeding as thin lochia.    Breastfeeding: declines.    Objective     Vital Signs Range for the last 24 hours  Temperature: Temp:  [97.5 °F (36.4 °C)-98.4 °F (36.9 °C)] 97.5 °F (36.4 °C)   Temp Source: Temp src: Oral   BP: BP: (109-136)/(65-90) 126/75   Pulse: Heart Rate:  [82-98] 82   Respirations: Resp:  [18] 18   SPO2: SpO2:  [96 %-98 %] 96 %   O2 Amount (l/min):     O2 Devices Device (Oxygen Therapy): room air   Weight:       Admit Height:         Physical Exam:  General:  no acute distresss.  Abdomen: abdomen is soft without significant tenderness, masses, organomegaly or guarding. Fundus: appropriate, firm, non tender  Extremities: normal, atraumatic, no cyanosis, and trace edema.       Lab results reviewed:  Yes   Rubella:  No results found for: \"RUBELLAIGGIN\" Nurse Transcribed from prenatal record --  No components found for: \"EXTRUBELQUAL\"  Rh Status:  No results found for: \"RH\"  Immunizations:   Immunization History   Administered Date(s) Administered    31-influenza Vac Quardvalent Preservativ 12/30/2020    DTP / HiB 03/06/1996    DTaP, Unspecified 01/07/1999    MMR 03/06/1996, 01/07/1999    OPV 01/07/1999    Tdap 06/27/2006, 03/25/2024    Varicella 01/07/1999     Lab Results (last 24 hours)       ** No results found for the last 24 hours. **            External Prenatal Results       Pregnancy Outside Results - Transcribed From Office Records - See Scanned Records For Details       Test Value Date Time    ABO  O  05/17/24 1614    Rh  Positive  05/17/24 1614    Antibody Screen  Negative  05/17/24 1614      ^ Normal  02/22/24     "   ^ Normal  02/22/24     Varicella IgG       Rubella ^ immune  02/22/24     Hgb  12.2 g/dL 05/21/24 0537       14.2 g/dL 05/17/24 1143       14.2 g/dL 05/15/24 0841       13.6 g/dL 05/13/24 1514       14.1 g/dL 05/10/24 1406       13.8 g/dL 04/22/24 1542       13.9 g/dL 04/16/24 1021       13.6 g/dL 03/25/24 0844      ^ 12.2 g/dL 02/22/24     Hct  38.9 % 05/21/24 0537       41.9 % 05/17/24 1143       43.2 % 05/15/24 0841       40.3 % 05/13/24 1514       40.3 % 05/10/24 1406       41.8 % 04/22/24 1542       42.8 % 04/16/24 1021       40.4 % 03/25/24 0844      ^ 38 % 02/22/24     Glucose Fasting GTT  87 mg/dL 04/25/24 1030    Glucose Tolerance Test 1 hour  172 mg/dL 04/25/24 1030      ^ 141  03/07/24     Glucose Tolerance Test 3 hour  144 mg/dL 04/25/24 1030    Gonorrhea (discrete)  Negative  05/10/24 1433      ^ not detected  02/22/24     Chlamydia (discrete)  Negative  05/10/24 1433      ^ not detected  02/22/24     RPR ^ Non-Reactive  02/22/24     VDRL       Syphilis Antibody       HBsAg  Non-Reactive  05/13/24 1747      ^ Negative  02/22/24     Herpes Simplex Virus PCR       Herpes Simplex VIrus Culture       HIV ^ negative  02/22/24     Hep C RNA Quant PCR       Hep C Antibody  Non-Reactive  05/13/24 1747      ^ Non-reactive  02/22/24     AFP       Group B Strep  Negative  05/10/24 1433    GBS Susceptibility to Clindamycin       GBS Susceptibility to Erythromycin       Fetal Fibronectin       Genetic Testing, Maternal Blood                 Drug Screening       Test Value Date Time    NIPT        Urine Drug Screen       Amphetamine Screen  Negative  05/18/24 0443      ^ Negative  02/22/24     Barbiturate Screen  Negative  05/18/24 0443      ^ negative  02/22/24     Benzodiazepine Screen  Negative  05/18/24 0443      ^ Negative  02/22/24     Methadone Screen  Negative  05/18/24 0443      ^ Negative  02/22/24     Phencyclidine Screen  Negative  05/18/24 0443      ^ negative  02/22/24     Opiates Screen  Negative   05/18/24 0443      ^ Negative  02/22/24     THC Screen  Positive  05/18/24 0443      ^ Positive  02/22/24     Cocaine Screen ^ negative  02/22/24     Propoxyphene Screen ^ negative  02/22/24     Buprenorphine Screen  Negative  05/18/24 0443      ^ Negative  02/22/24     Methamphetamine Screen       Oxycodone Screen  Negative  05/18/24 0443      ^ Negative  02/22/24               Legend    ^: Historical                            Assessment & Plan       Gestational diabetes mellitus (GDM) affecting pregnancy    Pyelectasis of fetus on prenatal ultrasound    Gestational hypertension, third trimester      Vanessa Menchaca is Day 2  post-partum  Vaginal, Spontaneous   .      Plan:  Continue current care Discharge home with standard precautions and return to clinic in 1 week for BP check.      Peter Dean MD  5/22/2024  07:28 CDT

## 2024-05-22 NOTE — CASE MANAGEMENT/SOCIAL WORK
SW consulted for late prenatal care. SW has spoken with patient who states that she has a good support system and has all needed items to care for baby at home. She declines a HANDS referral at this time and denies any needs or concerns. Mother and baby will dc home when medically ready.

## 2024-05-23 NOTE — PAYOR COMM NOTE
"REF: [id number]     [facility]  FAX  [phone]      Joe Menchaca (28 y.o. Female)       Date of Birth   1995    Social Security Number       Address   [address] [address]    Home Phone   [phone]    MRN   [id number]       Congregation   Other    Marital Status   Single                            Admission Date   24    Admission Type   Elective    Admitting Provider   Sallie Hassan MD    Attending Provider       Department, Room/Bed   [facility] MOTHER BABY 2A, M235/1       Discharge Date   2024    Discharge Disposition   Home or Self Care    Discharge Destination                                 Attending Provider: (none)   Allergies: No Known Allergies    Isolation: None   Infection: None   Code Status: Prior    Ht: 160 cm (63\")   Wt: 111 kg (243 lb 11.2 oz)    Admission Cmt: None   Principal Problem: Gestational hypertension, third trimester [O13.3]                   Active Insurance as of 2024       Primary Coverage       Payor Plan Insurance Group Employer/Plan Group    [employer] [place] [employer] [place]ICA[place]        Payor Plan Address Payor Plan Phone Number Payor Plan Fax Number Effective Dates    [address] [address] [phone]  10/17/2017 - None Entered    [facility] [id number]         Subscriber Name Subscriber Birth Date Member ID       JOE MENCHACA 1995 [id number]                     Emergency Contacts        (Rel.) Home Phone Work Phone Mobile Phone    Vern Zaman (Mother) [phone] -- --                 Discharge Summary        Josette Joiner MD at 24 0650          Discharge Summary     Ifrah Menchaca  : 1995  MRN: [id number]  CSN: [id number]    Date of Admission: 2024   Date of Discharge:  2024   Delivering Physician: Sallie Hassan        Admission Diagnosis: Pregnancy [Z34.90]  Hypertension affecting pregnancy in third trimester [O16.3]  Gestational hypertension " w/o significant proteinuria in 3rd trimester [O13.3]   Discharge Diagnosis: Pregnancy at 37w5d - delivered       Procedures: 2024  - Vaginal, Spontaneous       Hospital Course  Patient is a 28 y.o.  who at 37w5d had a vaginal birth.  Her postpartum course was without complications.  On PPD #2 she was ready for discharge.  She had normal lochia and pain well controlled with oral medications.  BP well-controlled    Infant  male  fetus weighing 3210 g (7 lb 1.2 oz)   Apgars -  3 @ 1 minute /  6 @ 5 minutes.    Discharge labs  Lab Results   Component Value Date    WBC 10.98 (H) 2024    HGB 12.2 2024    HCT 38.9 2024     2024       Discharge Medications     Discharge Medications        Continue These Medications        Instructions Start Date   glucose monitor monitoring kit   Check BS fasting and 2 hours postprandial      Lancets misc   Use as directed to check blood sugar four times a day, fasting and 2 hours postprandial      NIFEdipine XL 60 MG 24 hr tablet  Commonly known as: PROCARDIA XL   60 mg, Oral, Daily      Prenatal 19 chewable tablet       promethazine 25 MG tablet  Commonly known as: PHENERGAN       venlafaxine  MG 24 hr capsule  Commonly known as: EFFEXOR-XR   No dose, route, or frequency recorded.      venlafaxine XR 75 MG 24 hr capsule  Commonly known as: EFFEXOR-XR   No dose, route, or frequency recorded.             Stop These Medications      glucose blood test strip              Discharge Disposition Home or Self Care   Condition on Discharge: good   Follow-up: 2 weeks with Sallie Hassan.  Short-interval follow-up to check BP     Josette Joiner MD  2024    Electronically signed by Josette Joiner MD at 24 0652       Discharge Order (From admission, onward)       Start     Ordered    24 0649  Discharge patient  Once        Expected Discharge Date: 24   Discharge Disposition: Home or Self Care   Physician of Record for  Attribution - Please select from Treatment Team: EMRE HACKETT [[id number]]   Review needed by CMO to determine Physician of Record: No      Question Answer Comment   Physician of Record for Attribution - Please select from Treatment Team EMRE HACKETT    Review needed by CMO to determine Physician of Record No        05/22/24 0649

## 2024-05-29 ENCOUNTER — PATIENT OUTREACH (OUTPATIENT)
Dept: LABOR AND DELIVERY | Facility: HOSPITAL | Age: 29
End: 2024-05-29
Payer: COMMERCIAL

## 2024-05-29 NOTE — OUTREACH NOTE
Motherhood Connection  Postpartum Check-In    Questions/Answers      Flowsheet Row Responses   Visit Setting Telephone   Best Method for Contacting Cell   OB Discharge Note Reviewed  Reviewed   OB Discharge Navigator Reviewed  Reviewed   OB Discharge Medications Reviewed  Reviewed    discharged home with mother? Yes   Current Pain Levels 0-10 0   At Rest Pain Levels 0-10 0   Pain level with activity 0-10 0   Acceptable Pain Level 0-10 3   Verbalized Emotional State Acceptance   Family/Support Network Family   Level of Involvement in Care Attentive, Interactive, Supportive   Do you feel comfortable in your relationship with your baby? Yes   Have members of your household adjusted to your baby? Yes   Is the baby's father supportive and/or involved with the baby? Yes   How does your partner feel about the baby? Happy, Involved   Do you feel safe at home, school and work? Yes   Are you in a relationship with someone who threatens you or hurts you? No   Do you have the resources to keep yourself and your baby healthy and safe? Yes   Lochia (per patient report) Rubra   Amount Scant   Number of pads per day 3   Lochia Odor None   Is patient breastfeeding? No   How is breast suppression going? wearing bra and decreasing stimulation   Postpartum Depression Screening Education Education Provided   Doctor Appointments: Education Provided   Postpartum Care Education Education Provided   S & S to report Education Provided   Followup Appointments Made Yes   Well Child Visit Appointments Made Yes   Appointment Date 24   Provider/Agency Mo   Well Child Checkup Provider Name Batsheva Huertas   Well Child Check Up Date: 24   Umbilical Cord No reported signs or symptoms   Was the baby circumcised? Yes   Circumcision care and signs/symptoms to report Reviewed   Feeding Readiness Cues: Cooing, Crying, Eager, Energy for feeding, Finger Sucking   Infant Feeding Method Formula   Formula PO (mL) 2oz   Formula/Expressed  Milk frequency of feedings: every 2-4 hours   Number of wet diapers x 24 hours 10   Last BM x 24 hours 0 but baby has stooled within the last 2 days   Emesis (Unmeasured Occurence) scant   What safe sleep surface is available? Bassinet   Are there stuffed animals, toys, pillows, quilts, blankets, wedges, positioners, bumpers or other loose bedding in the infant's sleeping environment? No   Where does the baby usually sleep? Bassinet   Does the baby ever share a sleep surface with a sibling, adult or pet? No   Does the baby ever share a sleep surface in a bed, couch, recliner or other? No   What position do you place your baby to sleep for naps? Back   What position do you place your baby to sleep at night Back            Review of Systems    Most Recent [place]  Depression Scale Score (EPDS)    Performed by a clinician: 5 (2024 12:17 PM)      5 Ps Screen  completed    Vanessa is aware that the RN call center will call in 1-2 weeks.     Anjali Ramirez RN  Maternity Nurse Navigator    2024, 12:21 CDT

## 2024-05-30 LAB
CANNABINOIDS UR QL CFM: NORMAL
CARBOXYTHC/CREAT UR: 30 NG/MG CREAT
LEVEL OF DETECTION:: NORMAL

## 2024-05-31 ENCOUNTER — POSTPARTUM VISIT (OUTPATIENT)
Age: 29
End: 2024-05-31
Payer: COMMERCIAL

## 2024-05-31 VITALS
HEIGHT: 63 IN | WEIGHT: 224.8 LBS | BODY MASS INDEX: 39.83 KG/M2 | DIASTOLIC BLOOD PRESSURE: 96 MMHG | SYSTOLIC BLOOD PRESSURE: 142 MMHG

## 2024-05-31 DIAGNOSIS — Z30.017 ENCOUNTER FOR INITIAL PRESCRIPTION OF NEXPLANON: ICD-10-CM

## 2024-05-31 NOTE — PROGRESS NOTES
"Chief Complaint  Postpartum Care (Pt here for PP visit, vaginal delivery 2024, no complications. Pt denies any problems today.)    Subjective        Vanessa Menchaca presents to [facility] for postpartum visit. Pt s/p   after induction due to gestational HTN. Now on procardia xl daily. Reports normal BP at home.  Bottle feeding. Wants a nexplanon. Denies pp depression and mood is stable.   History of Present Illness    Objective   Vital Signs:  /96   Ht 160 cm (62.99\")   Wt 102 kg (224 lb 12.8 oz)   BMI 39.83 kg/m²   Estimated body mass index is 39.83 kg/m² as calculated from the following:    Height as of this encounter: 160 cm (62.99\").    Weight as of this encounter: 102 kg (224 lb 12.8 oz).       Class 2 Severe Obesity (BMI >=35 and <=39.9). Obesity-related health conditions include the following: hypertension. Obesity is improving with lifestyle modifications. BMI is is above average; BMI management plan is completed. We discussed portion control and increasing exercise.      Physical Exam   Abdomen: soft/NT/ND normal BS  Result Review :                     Assessment and Plan     Diagnoses and all orders for this visit:    1. Postpartum care following vaginal delivery (Primary)  27 y/o G1 now P1 s/p . Pt doing well. Bottle feeding. Baby doing well. RTC 4 weeks. Nexplanon ordered.   2. Encounter for initial prescription of Nexplanon             Follow Up     Return in about 4 weeks (around 2024).  Patient was given instructions and counseling regarding her condition or for health maintenance advice. Please see specific information pulled into the AVS if appropriate.         "

## 2024-06-05 ENCOUNTER — PATIENT OUTREACH (OUTPATIENT)
Dept: CALL CENTER | Facility: HOSPITAL | Age: 29
End: 2024-06-05
Payer: COMMERCIAL

## 2024-06-05 NOTE — OUTREACH NOTE
Motherhood Connection Survey      Flowsheet Row Responses   [facility] facility patient discharged from? [place]   Week 1 attempt successful? No   Unsuccessful attempts Attempt 1   Reschedule Today  [left msg on voicemail.]              FIDEL ZUNIGA - Registered Nurse

## 2024-06-05 NOTE — OUTREACH NOTE
Motherhood Connection Survey      Flowsheet Row Responses   [facility] facility patient discharged from? [place]  [male/vag]   Week 1 attempt successful? No   Unsuccessful attempts Attempt 2   Reschedule Tomorrow              FIDEL ZUNIGA - Registered Nurse

## 2024-06-06 ENCOUNTER — PATIENT OUTREACH (OUTPATIENT)
Dept: CALL CENTER | Facility: HOSPITAL | Age: 29
End: 2024-06-06
Payer: COMMERCIAL

## 2024-06-06 NOTE — OUTREACH NOTE
Motherhood Connection Survey      Flowsheet Row Responses   [facility] facility patient discharged from? [place]   Week 1 attempt successful? No   Unsuccessful attempts Attempt 3              Romelia FERNANDES - Licensed Nurse

## 2024-07-01 ENCOUNTER — POSTPARTUM VISIT (OUTPATIENT)
Age: 29
End: 2024-07-01
Payer: COMMERCIAL

## 2024-07-01 VITALS
SYSTOLIC BLOOD PRESSURE: 138 MMHG | DIASTOLIC BLOOD PRESSURE: 92 MMHG | WEIGHT: 217 LBS | HEIGHT: 63 IN | BODY MASS INDEX: 38.45 KG/M2

## 2024-07-01 PROCEDURE — 0503F POSTPARTUM CARE VISIT: CPT

## 2024-07-01 NOTE — PROGRESS NOTES
"Subjective   Chief Complaint   Patient presents with    Postpartum Care     6 wk pp care. Pt has signed form for nexplanon.      Vanessa Menchaca is a 28 y.o. year old  presenting to be seen for her postpartum visit.  She had a vaginal delivery.   Prenatal course was been complicated by GDM, abnormal fetal ultrasound, and gestational hypertension.    Since delivery she has been sexually active.  She does not have concerns about post-partum blues/depression.   She is bottle feeding.    The following portions of the patient's history were reviewed and updated as appropriate:problem list, current medications, and allergies    Social History     Socioeconomic History    Marital status: Single   Tobacco Use    Smoking status: Former     Current packs/day: 0.10     Types: Cigarettes    Smokeless tobacco: Never   Vaping Use    Vaping status: Some Days    Substances: Nicotine   Substance and Sexual Activity    Alcohol use: Not Currently     Comment: occasional    Drug use: Not Currently     Types: Marijuana     Comment: positive THC 24    Sexual activity: Yes     Partners: Male     Birth control/protection: None     Review of Systems   Constitutional:  Negative for fatigue.   Respiratory: Negative.     Cardiovascular: Negative.    Gastrointestinal: Negative.    Genitourinary: Negative.    Musculoskeletal: Negative.    Skin:  Positive for rash (poison ivy on face, arms, legs).   Neurological: Negative.    Psychiatric/Behavioral: Negative.           Objective   /92   Ht 160 cm (62.99\")   Wt 98.4 kg (217 lb)   LMP 2024 (Exact Date)   Breastfeeding No Comment: similac  BMI 38.45 kg/m²     General:  well developed; well nourished  no acute distress   Abdomen: soft, non-tender; bowel sounds normal; no masses, no organomegaly   Pelvis: Not performed.       Diagnoses and all orders for this visit:    1. Postpartum care following vaginal delivery (Primary)  - Normal 6-week postpartum exam. Counseled on " release to gradually resume regular activities of daily living.   -May resume sexually intimacy when she feels ready but encouraged to use lots of lubricant and take things slowly. If it feels uncomfortable, stop and try again later.  -Encouraged to practice kegel exercises for pelvic floor strength/integrity for both bladder control and for sexual wellness. Information included in AVS.  - Encouraged to take time to care for self and discussed measures of support (relaxation, meditation, warm epsom salt bath, rest, exercise, aromatherapy) as part of healthy lifestyle for dealing with stressors. Also, discussed postpartum emotions and encouraged to notify provider of concerns with feelings of depression or anxiety.    - Return to office annually for a well woman exam and as needed for any concerns.           --Desires Nexplanon; paperwork was already signed and she will return once it is here at the office. Condoms for birth control in the meantime.      This note was electronically signed.    TENA Wen  July 1, 2024

## 2024-08-01 ENCOUNTER — PROCEDURE VISIT (OUTPATIENT)
Age: 29
End: 2024-08-01
Payer: COMMERCIAL

## 2024-08-01 VITALS
WEIGHT: 216 LBS | BODY MASS INDEX: 38.27 KG/M2 | SYSTOLIC BLOOD PRESSURE: 122 MMHG | DIASTOLIC BLOOD PRESSURE: 78 MMHG | HEIGHT: 63 IN

## 2024-08-01 DIAGNOSIS — Z30.017 INSERTION OF NEXPLANON: Primary | ICD-10-CM

## 2024-08-01 DIAGNOSIS — Z12.4 SCREENING FOR CERVICAL CANCER: ICD-10-CM

## 2024-08-01 PROBLEM — O35.EXX0 PYELECTASIS OF FETUS ON PRENATAL ULTRASOUND: Status: RESOLVED | Noted: 2024-04-16 | Resolved: 2024-08-01

## 2024-08-01 PROBLEM — Z97.5 NEXPLANON IN PLACE: Status: ACTIVE | Noted: 2024-08-01

## 2024-08-01 LAB
B-HCG UR QL: NEGATIVE
EXPIRATION DATE: NORMAL
INTERNAL NEGATIVE CONTROL: NEGATIVE
INTERNAL POSITIVE CONTROL: POSITIVE
Lab: NORMAL

## 2024-08-01 PROCEDURE — G0123 SCREEN CERV/VAG THIN LAYER: HCPCS | Performed by: ADVANCED PRACTICE MIDWIFE

## 2024-08-01 RX ORDER — ETONOGESTREL 68 MG/1
1 IMPLANT SUBCUTANEOUS ONCE
COMMUNITY
Start: 2024-07-18

## 2024-08-01 NOTE — PROGRESS NOTES
Subdermal Contraceptive Implant Insertion Note    Vanessa Menchaca desires a subdermal etonogestrel contraceptive implant insertion.  She has been counseled regarding the risks, benefits and alternatives to the implant.  She especially understands that her menstrual periods are expected to become irregular and unpredictable throughout the time she is using the implant.  She has no contraindications to the insertion.  Her questions have been answered.  She has fully reviewed the FDA-approved consent brochure, has signed the consent form, and wishes to proceed with the insertion today.     Current method of contraception:  condoms    Patient's last menstrual period was 07/04/2024 (approximate).    Urine pregnancy test: negative    Procedure Time Out Documentation  Correct Patient: Yes  Correct Site: Yes  Correct Side: Yes  Correct Patient Position: Yes  Correct Procedure Verified With Consent?: Yes  What Procedure?: Nexplanon insertion  Antibiotics Ordered: N/A  Consents Verified?: Yes  Rad Studies Available?: No  Lab Results Available?: Yes  Safety Precautions Reviewed?: Yes    Procedure Details  A time-out was performed prior to initiating procedure to confirm correct patient, correct procedure, and correct location. Intended insertion site and guiding site measured 8-10 cm from the medial epicondyle along the triceps muscle and marked on the upper left arm. Site was anesthetized with lidocaine and confirmed. The planned insertion site and surrounding area cleansed with Betadine. Nexplanon trocar examined for implant capsule. Nexplanon trocar inserted subcutaneously and advanced. The Nexplanon capsule was the deployed subcutaneously without difficulty. Trocar removed from the insertion site.  The implant device was able to be palpated in the arm by both myself and patient to ensure satisfactory placement.  Steri-strips then placed across the site of insertion and the arm wrapped with compressive dressing.    She  tolerated the procedure well.  There were no complications.  EBL was minimal.    Lot:  [id number]   Exp:  10/2026    Vanessa was given post-insertion instructions.  She understands that the implant must be removed at the end of three years and may be removed sooner if she wishes.    Post procedure instructions: Remove the wrapping in 24 hours and the steri-strips in 5 days.    Follow up needed: 2 weeks for incision check        This note was electronically signed.    Meryl Frederick, JUDD, APRN, CNM, RNC-OB
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
## 2024-08-02 LAB
GEN CATEG CVX/VAG CYTO-IMP: NORMAL
LAB AP CASE REPORT: NORMAL
LAB AP GYN ADDITIONAL INFORMATION: NORMAL
LAB AP GYN OTHER FINDINGS: NORMAL
Lab: NORMAL
PATH INTERP SPEC-IMP: NORMAL
STAT OF ADQ CVX/VAG CYTO-IMP: NORMAL